# Patient Record
Sex: FEMALE | Race: WHITE | HISPANIC OR LATINO | Employment: UNEMPLOYED | URBAN - METROPOLITAN AREA
[De-identification: names, ages, dates, MRNs, and addresses within clinical notes are randomized per-mention and may not be internally consistent; named-entity substitution may affect disease eponyms.]

---

## 2020-05-08 ENCOUNTER — VIRTUAL VISIT (OUTPATIENT)
Dept: FAMILY MEDICINE | Facility: CLINIC | Age: 40
End: 2020-05-08
Payer: MEDICAID

## 2020-05-08 DIAGNOSIS — N92.6 MISSED PERIOD: Primary | ICD-10-CM

## 2020-05-08 DIAGNOSIS — N92.6 MISSED PERIOD: ICD-10-CM

## 2020-05-08 LAB — HCG UR QL: POSITIVE

## 2020-05-08 PROCEDURE — 99203 OFFICE O/P NEW LOW 30 MIN: CPT | Mod: 95 | Performed by: PREVENTIVE MEDICINE

## 2020-05-08 PROCEDURE — 81025 URINE PREGNANCY TEST: CPT | Performed by: PREVENTIVE MEDICINE

## 2020-05-08 NOTE — PROGRESS NOTES
"Tiffany Duffy is a 43 year old female who is being evaluated via a billable telephone visit.      The patient has been notified of following:     \"This telephone visit will be conducted via a call between you and your physician/provider. We have found that certain health care needs can be provided without the need for a physical exam.  This service lets us provide the care you need with a short phone conversation.  If a prescription is necessary we can send it directly to your pharmacy.  If lab work is needed we can place an order for that and you can then stop by our lab to have the test done at a later time.    Telephone visits are billed at different rates depending on your insurance coverage. During this emergency period, for some insurers they may be billed the same as an in-person visit.  Please reach out to your insurance provider with any questions.    If during the course of the call the physician/provider feels a telephone visit is not appropriate, you will not be charged for this service.\"    Patient has given verbal consent for Telephone visit?  Yes    What phone number would you like to be contacted at? [phone]    How would you like to obtain your AVS? Mail a copy    Subjective     Tiffany Duffy is a 43 year old female who presents to clinic today for the following health issues:    HPI       Visit for confirmation of pregnancy.  No abdominal pain, no dysuria or frequency, no vaginal spotting or bleeding.  No nausea or emesis.  Not taking prenatal vitamins.  No use of tobacco or alcohol. Needs referral to OB/GYN.   Home pregnancy tests are positive. Not a planned pregnancy but plans to keep     , +  Some pinkish discharge     LMP: not sure, late by about a week 4/3/2020  No contraception  Regular periods usually     Pap smear due 2020, canceled due to COvid 19     There is no problem list on file for this patient.    History reviewed. No pertinent surgical history.    Social " History     Tobacco Use     Smoking status: Never Smoker     Smokeless tobacco: Never Used   Substance Use Topics     Alcohol use: Yes     Family History   Family history unknown: Yes         No current outpatient medications on file.     No Known Allergies  BP Readings from Last 3 Encounters:   No data found for BP    Wt Readings from Last 3 Encounters:   No data found for Wt            Reviewed and updated as needed this visit by Provider  Tobacco  Allergies  Meds  Problems  Med Hx  Surg Hx  Fam Hx         Review of Systems   ROS COMP: Constitutional, HEENT, cardiovascular, pulmonary, gi and gu systems are negative, except as otherwise noted.       Objective   Reported vitals:  There were no vitals taken for this visit.   healthy, alert and no distress  PSYCH: Alert and oriented times 3; coherent speech, normal   rate and volume, able to articulate logical thoughts, able   to abstract reason, no tangential thoughts, no hallucinations   or delusions  Her affect is normal  RESP: No cough, no audible wheezing, able to talk in full sentences  Remainder of exam unable to be completed due to telephone visits    Diagnostic Test Results:  Labs reviewed in Epic  No results found for this or any previous visit (from the past 24 hour(s)).        Assessment/Plan:  1. Missed period  -LMP about 5 weeks ago  -Home pregnancy test is positive  -Will come in today for labs, if positive then refer to GYN and start prenatal vitamins   -due for pap smear   - HCG Qual, Urine ([id number]); Future    Return in about 1 day (around 5/9/2020) for Lab Work.      Phone call duration:  9 minutes      Jeanie Bustamante MD MPH

## 2020-05-09 ENCOUNTER — MYC MEDICAL ADVICE (OUTPATIENT)
Dept: FAMILY MEDICINE | Facility: CLINIC | Age: 40
End: 2020-05-09

## 2020-05-12 ENCOUNTER — TELEPHONE (OUTPATIENT)
Dept: FAMILY MEDICINE | Facility: CLINIC | Age: 40
End: 2020-05-12

## 2020-05-12 DIAGNOSIS — Z32.01 PREGNANCY TEST POSITIVE: Primary | ICD-10-CM

## 2020-05-12 RX ORDER — PRENATAL VIT/IRON FUM/FOLIC AC 27MG-0.8MG
1 TABLET ORAL DAILY
Qty: 90 TABLET | Refills: 3 | Status: SHIPPED | OUTPATIENT
Start: 2020-05-12

## 2020-05-12 NOTE — TELEPHONE ENCOUNTER
.Reason for call:  Medication   If this is a refill request, has the caller requested the refill from the pharmacy already? No  Will the patient be using a [facility]? No  Name of the pharmacy and phone number [facility] the [facility] [address] [facility]    Name of the medication requested: prenatal vitamins    Other request: pt was supposed to get prenatal vitamins from Dr. Perera and they are not at the pharmacy. No orders for the vitamins on file. Please fill for pt    Phone number to reach patient:  Home number on file [phone] (home)    Best Time:  anytime    Can we leave a detailed message on this number?  YES    Travel screening: Negative

## 2020-05-12 NOTE — TELEPHONE ENCOUNTER
Patient has viewed [employer] message of results.     Lacie Reyna RN  [facility]

## 2020-05-12 NOTE — RESULT ENCOUNTER NOTE
Please CALL patient:    Dear Tiffany Duffy,    Pregnancy test is positive. Needs to establish with OBGYN for prenatal care. Please call [phone] to make an appointment at [facility] (prefers a female physician). I have sent in pre amandeep vitamins to the pharmacy.     Regards,    Jeanie Bustamante MD MPH

## 2020-05-21 ENCOUNTER — PRENATAL OFFICE VISIT (OUTPATIENT)
Dept: NURSING | Facility: CLINIC | Age: 40
End: 2020-05-21

## 2020-05-21 VITALS — HEIGHT: 57 IN | BODY MASS INDEX: 26.32 KG/M2 | WEIGHT: 122 LBS

## 2020-05-21 DIAGNOSIS — D64.9 ANEMIA: ICD-10-CM

## 2020-05-21 DIAGNOSIS — O09.529 ELDERLY MULTIGRAVIDA WITH ANTEPARTUM CONDITION OR COMPLICATION: Primary | ICD-10-CM

## 2020-05-21 DIAGNOSIS — A49.1 GBS (GROUP B STREPTOCOCCUS) INFECTION: ICD-10-CM

## 2020-05-21 PROCEDURE — 99207 ZZC NO CHARGE NURSE ONLY: CPT

## 2020-05-21 SDOH — SOCIAL STABILITY: SOCIAL INSECURITY: WITHIN THE LAST YEAR, HAVE YOU BEEN AFRAID OF YOUR PARTNER OR EX-PARTNER?: NO

## 2020-05-21 SDOH — SOCIAL STABILITY: SOCIAL INSECURITY: WITHIN THE LAST YEAR, HAVE YOU BEEN HUMILIATED OR EMOTIONALLY ABUSED IN OTHER WAYS BY YOUR PARTNER OR EX-PARTNER?: NO

## 2020-05-21 SDOH — SOCIAL STABILITY: SOCIAL INSECURITY
WITHIN THE LAST YEAR, HAVE YOU BEEN KICKED, HIT, SLAPPED, OR OTHERWISE PHYSICALLY HURT BY YOUR PARTNER OR EX-PARTNER?: NO

## 2020-05-21 SDOH — SOCIAL STABILITY: SOCIAL NETWORK: ARE YOU MARRIED, WIDOWED, DIVORCED, SEPARATED, NEVER MARRIED, OR LIVING WITH A PARTNER?: MARRIED

## 2020-05-21 SDOH — HEALTH STABILITY: MENTAL HEALTH
STRESS IS WHEN SOMEONE FEELS TENSE, NERVOUS, ANXIOUS, OR CAN'T SLEEP AT NIGHT BECAUSE THEIR MIND IS TROUBLED. HOW STRESSED ARE YOU?: NOT AT ALL

## 2020-05-21 SDOH — SOCIAL STABILITY: SOCIAL INSECURITY
WITHIN THE LAST YEAR, HAVE TO BEEN RAPED OR FORCED TO HAVE ANY KIND OF SEXUAL ACTIVITY BY YOUR PARTNER OR EX-PARTNER?: NO

## 2020-05-21 ASSESSMENT — MIFFLIN-ST. JEOR: SCORE: 1087.27

## 2020-05-21 NOTE — PROGRESS NOTES
Important Information for Provider:       New ob nurse intake fifth pregnancy,AMA, ordered first trimester screening/ NIPT.  Patient unsure of exact date of LMP, ultrasound and NOB with CNM 6/11/2020. Patient did not pass her 1 hour GCT last pregnancy, passed 3 hour GTT.  No other history of gestational diabetes in her other pregnancies. Family history of diabetes. Patient plans to do her 1 hour early GCT on 6/11/2020. Handouts reviewed and mailed. Patient was in [place] scheduled but patient wanted to see our CNM's.    Caffeine intake/servings daily - 0  Calcium intake/servings daily - 3  Exercise 4 times weekly - describe ; walks, precautions given  Sunscreen used - Yes  Seatbelts used - Yes  Guns stored in the home - No  Self Breast Exam - Yes  Pap test up to date -  Yes  Eye exam up to date -  Yes  Dental exam up to date -  Yes  Immunizations reviewed and up to date - Yes  Abuse: Current or Past (Physical, Sexual or Emotional) - No  Do you feel safe in your environment - Yes  Do you cope well with stress - Yes  Do you suffer from insomnia - No          Prenatal OB Questionnaire  Patient supplied answers from flow sheet for:  Prenatal OB Questionnaire.  Past Medical History  Diabetes?: No  Hypertension : No  Heart disease, mitral valve prolapse or rheumatic fever?: No  An autoimmune disease such as lupus or rheumatoid arthritis?: No  Kidney disease or urinary tract infection?: No  Epilepsy, seizures or spells?: No  Migraine headaches?: No  A stroke or loss of function or sensation?: No  Any other neurological problems?: No  Have you ever been treated for depression?: No  Are you having problems with crying spells or loss of self-esteem?: No  Have you ever required psychiatric care?: No  Have you ever had hepatitis, liver disease or jaundice?: No  Have you been treated for blood clots in your veins, deep vein thromosis, inflammation in the veins, thrombosis, phlebitis, pulmonary embolism or varicosities?:  No  Have you had excessive bleeding after surgery or dental work?: No  Do you bleed more than other women after a cut or scratch?: No  Do you have a history of anemia?: in pregnancy  Have you ever had thyroid problems or taken thyroid medication?: No   Do you have any endocrine problems?: No  Have you ever been in a major accident or suffered serious trauma?: No  Within the last year, has anyone hit, slapped, kicked or otherwise hurt you?: No  In the last year, has anyone forced you to have sex when you didn't want to?: No    Past Medical History 2   Have you ever received a blood transfusion?: No  Would you refuse a blood transfusion if a doctor judged it to be medically necessary?: No   If you answered Yes, would you rather die than receive a blood transfusion?: No  If you answered Yes, is this for Scientologist reasons?: No  Does anyone in your home smoke?: No  Do you use tobacco products?: No  Do you drink beer, wine or hard liquor?: No  Do you use any of the following: marijuana, speed, cocaine, heroin, hallucinogens or other drugs?: No   Is your blood type Rh negative?: No  Have you ever had abnormal antibodies in your blood?: No  Have you ever had asthma?: No  Have you ever had tuberculosis?: No  Do you have any allergies to drugs or over-the-counter medications?: No  Allergies: Dust Mites, Aspartame, Ethanol, Venlafaxine, Hydrochloride, Sertraline: No  Have you had any breast problems?: No  Have you ever ?: (!) Yes  Have you had any gynecological surgical procedures such as cervical conization, a LEEP procedure, laser treatment, cryosurgery of the cervix or a dilation and curettage, etc?: No  Have you ever had any other surgical procedures?: No  Have you been hospitalized for a nonsurgical reason excluding normal delivery?: No  Have you ever had any anesthetic complications?: No  Have you ever had an abnormal pap smear?: No    Past Medical History (Continued)  Do you have a history of abnormalities of  the uterus?: No  Did your mother take ARDEN or any other hormones when she was pregnant with you?: No  Did it take you more than a year to become pregnant?: No  Have you ever been evaluated or treated for infertility?: No  Is there a history of medical problems in your family, which you feel may be important to this pregnancy?: No  Do you have any other problems we have not asked about which you feel may be important to this pregnancy?: No    Symptoms since last menstrual period  Do you have any of the following symptoms: abdominal pain, blood in stools or urine, chest pain, shortness of breath, coughing or vomiting up blood, your heart racing or skipping beats, nausea and vomiting, pain on urination or vaginal discharge or bleed: (!) Yes  Will the patient be 35 years old or older at the time of delivery?: (!) Yes    Has the patient, baby's father or anyone in either family had:  Thalassemia (Italian, Greek, Mediterranean or  background only) and an MCV result less than 80?: No  Neural tube defect such as meningomyelocele, spina bifida or anencephaly?: No  Congenital heart defect?: No  Down's Syndrome?: No  Sanket-Sachs disease (Confucianist, Cajun, Citizen of [place] African)?: No  Sickle cell disease or trait ()?: No  Hemophilia or other inherited problems of blood?: No  Muscular dystrophy?: No  Cystic fibrosis?: No  Georges Mills's chorea?: No  Mental retardation/autism?: No  If yes, was the person tested for fragile X?: No  Any other inherited genetic or chromosomal disorder?: No  Maternal metabolic disorder (e.g Insulin-dependent diabetes, PKU)?: No  A child with birth defects not listed above?: No  Recurrent pregnancy loss or stillbirth?: No   Has the patient had any medications/street drugs/alcohol since her last menstrual period?: No  Does the patient or baby's father have any other genetic risks?: No    Infection History   Do you object to being tested for Hepatitis B?: No  Do you object to being tested for HIV?: No    Do you feel that you are at high risk for coming in contact with the AIDS virus?: No  Have you ever been treated for tuberculosis?: No  Have you ever had a positive skin test for tuberculosis?: No  Do you live with someone who has tuberculosis?: No  Have you ever been exposed to tuberculosis?: No  Do you have genital herpes?: No  Does your partner have genital herpes?: No  Have you had a viral illness since your last period?: No  Have you ever had gonorrhea, chlamydia, syphilis, venereal warts, trichomoniasis, pelvic inflammatory disease or any other sexually transmitted disease?: No  Do you know if you are a genital group B streptococcus carrier?: Yes  Have you had chicken pox/varicella?: (!) Yes   Have you been vaccinated against chicken Pox?: No  Have you had any other infectious diseases?: No      Allergies as of 5/21/2020:    Allergies as of 05/21/2020     (No Known Allergies)       Current medications are:  Current Outpatient Medications   Medication Sig Dispense Refill     Prenatal Vit-Fe Fumarate-FA (PRENATAL MULTIVITAMIN W/IRON) 27-0.8 MG tablet Take 1 tablet by mouth daily 90 tablet 3         Early ultrasound screening tool:    Does patient have irregular periods?  No  Did patient use hormonal birth control in the three months prior to positive urine pregnancy test? No  Is the patient breastfeeding?  No  Is the patient 10 weeks or greater at time of education visit?  No         None

## 2020-05-22 ENCOUNTER — TRANSCRIBE ORDERS (OUTPATIENT)
Dept: MATERNAL FETAL MEDICINE | Facility: CLINIC | Age: 40
End: 2020-05-22

## 2020-05-22 DIAGNOSIS — O26.90 PREGNANCY RELATED CONDITION, ANTEPARTUM: Primary | ICD-10-CM

## 2020-05-30 ENCOUNTER — NURSE TRIAGE (OUTPATIENT)
Dept: NURSING | Facility: CLINIC | Age: 40
End: 2020-05-30

## 2020-05-31 NOTE — TELEPHONE ENCOUNTER
Caller is 8 weeks pregnant and states she is having moderate facial swelling. Caller states he had a similar reactions same time last year. Caller states her face, lip, and eyes are swollen. Caller denies any pain to area, just that the area is red. Caller denies any difficulty breathing or swallowing. Triage guidelines recommend to see provider within 4 hours. Caller verbalized and understands directives.  COVID 19 Nurse Triage Plan/Patient Instructions    Please be aware that novel coronavirus (COVID-19) may be circulating in the community. If you develop symptoms such as fever, cough, or SOB or if you have concerns about the presence of another infection including coronavirus (COVID-19), please contact your health care provider or visit [email or web address].     Disposition/Instructions    Patient to go to ED and follow protocol based instructions. Follow System Ambulatory Workflow for COVID 19.     Bring Your Own Device:  Please also bring your smart device(s) (smart phones, tablets, laptops) and their charging cables for your personal use and to communicate with your care team during your visit.    Thank you for limiting contact with others, wearing a simple mask to cover your cough, practice good hand hygiene habits and accessing our virtual services where possible to limit the spread of this virus.    For more information about COVID19 and options for caring for yourself at home, please visit the CDC website at https://www.cdc.gov/coronavirus/2019-ncov/about/steps-when-sick.html  For more options for care at [facility], please visit our website at [email or web address]    For more information, please use the [place] Department of Health COVID-19 Website: https://www.health.state.[place]/diseases/coronavirus/index.html  [place] Department of Health ([facility]) COVID-19 Hotlines (Interpreters available):      Health questions: Phone Number: [phone] or [phone] and Hours: 7  a.m. to 7 p.m.    Schools and  questions: Phone Number: [phone] or [phone] and Hours 7 a.m. to 7 p.m.                  Additional Information    Negative: [1] Life-threatening reaction (anaphylaxis) in the past to similar substance (e.g., food, insect bite/sting, chemical, etc.) AND [2] < 2 hours since exposure    Negative: Unresponsive, passed out or very weak    Negative: Swollen tongue    Negative: Difficulty breathing or wheezing    Negative: Sounds like a life-threatening emergency to the triager    Negative: Followed a face injury    Negative: [1] Bee sting AND [2] within last 24 hours    Negative: Insect bite suspected    Negative: Swelling mainly of lip(s)    Negative: Swelling mainly around the eyes    Negative: [1] SEVERE swelling of entire face AND [2] < 2 hours since exposure to high-risk allergen (e.g., peanuts, tree nuts, fish, shellfish or 1st dose of drug) AND [3] no serious symptoms AND [4] no serious allergic reaction in the past    SEVERE swelling of the entire face    Negative: Pregnant > 20 weeks    Negative: Postpartum (i.e. < 1 month since delivery)    Negative: Fever    Negative: Taking an ACE Inhibitor medication  (e.g., benazepril/LOTENSIN, captopril/CAPOTEN, enalapril/VASOTEC, lisinopril/ZESTRIL)    Negative: Patient sounds very sick or weak to the triager    Protocols used: FACE SWELLING-A-AH

## 2020-06-11 ENCOUNTER — PRENATAL OFFICE VISIT (OUTPATIENT)
Dept: MIDWIFE SERVICES | Facility: CLINIC | Age: 40
End: 2020-06-11
Payer: MEDICAID

## 2020-06-11 ENCOUNTER — ANCILLARY PROCEDURE (OUTPATIENT)
Dept: ULTRASOUND IMAGING | Facility: CLINIC | Age: 40
End: 2020-06-11
Attending: ADVANCED PRACTICE MIDWIFE
Payer: MEDICAID

## 2020-06-11 VITALS — DIASTOLIC BLOOD PRESSURE: 68 MMHG | WEIGHT: 145.3 LBS | BODY MASS INDEX: 31.44 KG/M2 | SYSTOLIC BLOOD PRESSURE: 112 MMHG

## 2020-06-11 DIAGNOSIS — O09.529 ELDERLY MULTIGRAVIDA WITH ANTEPARTUM CONDITION OR COMPLICATION: Primary | ICD-10-CM

## 2020-06-11 DIAGNOSIS — O09.529 ELDERLY MULTIGRAVIDA WITH ANTEPARTUM CONDITION OR COMPLICATION: ICD-10-CM

## 2020-06-11 DIAGNOSIS — Z3A.11 11 WEEKS GESTATION OF PREGNANCY: ICD-10-CM

## 2020-06-11 LAB
ABO + RH BLD: NORMAL
ABO + RH BLD: NORMAL
ALBUMIN UR-MCNC: NEGATIVE MG/DL
APPEARANCE UR: CLEAR
BILIRUB UR QL STRIP: NEGATIVE
BLD GP AB SCN SERPL QL: NORMAL
BLOOD BANK CMNT PATIENT-IMP: NORMAL
COLOR UR AUTO: YELLOW
ERYTHROCYTE [DISTWIDTH] IN BLOOD BY AUTOMATED COUNT: 13.9 % (ref 10–15)
GLUCOSE 1H P 50 G GLC PO SERPL-MCNC: 65 MG/DL (ref 60–129)
GLUCOSE UR STRIP-MCNC: NEGATIVE MG/DL
HBA1C MFR BLD: 5.1 % (ref 0–5.6)
HCT VFR BLD AUTO: 35.2 % (ref 35–47)
HGB BLD-MCNC: 12 G/DL (ref 11.7–15.7)
HGB UR QL STRIP: ABNORMAL
KETONES UR STRIP-MCNC: NEGATIVE MG/DL
LEUKOCYTE ESTERASE UR QL STRIP: ABNORMAL
MCH RBC QN AUTO: 31.9 PG (ref 26.5–33)
MCHC RBC AUTO-ENTMCNC: 34.1 G/DL (ref 31.5–36.5)
MCV RBC AUTO: 94 FL (ref 78–100)
NITRATE UR QL: NEGATIVE
PH UR STRIP: 7 PH (ref 5–7)
PLATELET # BLD AUTO: 242 10E9/L (ref 150–450)
RBC # BLD AUTO: 3.76 10E12/L (ref 3.8–5.2)
SOURCE: ABNORMAL
SP GR UR STRIP: 1.02 (ref 1–1.03)
SPECIMEN EXP DATE BLD: NORMAL
UROBILINOGEN UR STRIP-ACNC: 0.2 EU/DL (ref 0.2–1)
WBC # BLD AUTO: 6.9 10E9/L (ref 4–11)

## 2020-06-11 PROCEDURE — 82950 GLUCOSE TEST: CPT | Performed by: ADVANCED PRACTICE MIDWIFE

## 2020-06-11 PROCEDURE — 76815 OB US LIMITED FETUS(S): CPT | Performed by: OBSTETRICS & GYNECOLOGY

## 2020-06-11 PROCEDURE — 85027 COMPLETE CBC AUTOMATED: CPT | Performed by: ADVANCED PRACTICE MIDWIFE

## 2020-06-11 PROCEDURE — 86900 BLOOD TYPING SEROLOGIC ABO: CPT | Performed by: ADVANCED PRACTICE MIDWIFE

## 2020-06-11 PROCEDURE — 86850 RBC ANTIBODY SCREEN: CPT | Performed by: ADVANCED PRACTICE MIDWIFE

## 2020-06-11 PROCEDURE — 99203 OFFICE O/P NEW LOW 30 MIN: CPT | Performed by: ADVANCED PRACTICE MIDWIFE

## 2020-06-11 PROCEDURE — 87086 URINE CULTURE/COLONY COUNT: CPT | Performed by: ADVANCED PRACTICE MIDWIFE

## 2020-06-11 PROCEDURE — 86901 BLOOD TYPING SEROLOGIC RH(D): CPT | Performed by: ADVANCED PRACTICE MIDWIFE

## 2020-06-11 PROCEDURE — 86762 RUBELLA ANTIBODY: CPT | Performed by: ADVANCED PRACTICE MIDWIFE

## 2020-06-11 PROCEDURE — 83021 HEMOGLOBIN CHROMOTOGRAPHY: CPT | Mod: 90 | Performed by: ADVANCED PRACTICE MIDWIFE

## 2020-06-11 PROCEDURE — 87389 HIV-1 AG W/HIV-1&-2 AB AG IA: CPT | Performed by: ADVANCED PRACTICE MIDWIFE

## 2020-06-11 PROCEDURE — 99000 SPECIMEN HANDLING OFFICE-LAB: CPT | Performed by: ADVANCED PRACTICE MIDWIFE

## 2020-06-11 PROCEDURE — 36415 COLL VENOUS BLD VENIPUNCTURE: CPT | Performed by: ADVANCED PRACTICE MIDWIFE

## 2020-06-11 PROCEDURE — 83036 HEMOGLOBIN GLYCOSYLATED A1C: CPT | Performed by: ADVANCED PRACTICE MIDWIFE

## 2020-06-11 PROCEDURE — 81003 URINALYSIS AUTO W/O SCOPE: CPT | Performed by: ADVANCED PRACTICE MIDWIFE

## 2020-06-11 PROCEDURE — 87340 HEPATITIS B SURFACE AG IA: CPT | Performed by: ADVANCED PRACTICE MIDWIFE

## 2020-06-11 PROCEDURE — 86780 TREPONEMA PALLIDUM: CPT | Performed by: ADVANCED PRACTICE MIDWIFE

## 2020-06-11 ASSESSMENT — PATIENT HEALTH QUESTIONNAIRE - PHQ9: SUM OF ALL RESPONSES TO PHQ QUESTIONS 1-9: 0

## 2020-06-11 NOTE — PROGRESS NOTES
11w3d   Tiffany Duffy is a 43 year old who presents to the clinic for an new ob visit. She is not a previous CNM patient. She [place] [facility] She had anemia and GBS in her last pregnancy. She is uncertain of her LMP, approximate based on her daughters menses. Ultrasound today shows 1 week further along than LMP, so dating changed to match today's U/S.  Estimated Date of Delivery: Jan 4, 2021 is calculated from 11w3d ultrasound.  Patient's last menstrual period was 03/30/2020.     She has had bleeding since her LMP.  A small amount in early pregnancy when she thought she was going to get a period.   She has had mild nausea. Weigh loss has not occurred.   This was a planned pregnancy.   FOB is involved,  Herb   OTHER CONCERNS: no    INFECTION HISTORY  HIV: no  Hepatitis B: no  Hepatitis C: no  Syphilis:  no  Tuberculosis: no   PPD- no  Herpes self: no  Herpes partner:  no  Chlamydia:  no  Gonorrhea:  no  HPV: no  BV:  unknown  Trichomonis:  no  Chicken Pox: unknown  ====================================================  GENETIC SCREENING  Genetic screening reviewed. High Risk? Yes, age >35  ====================================================  PERSONAL/SOCIAL HISTORY  Lives lives with their family.  Employment: Unemployed.  Her job involves moderate activity .  HX OF ABUSE: no  =====================================================   REVIEW OF SYSTEMS  CONSTITUTIONAL: NEGATIVE for fever, chills  EYES: NEGATIVE for vision changes   RESP: NEGATIVE for significant cough or SOB  CV: NEGATIVE for chest pain, palpitations   GI: POSITIVE for constipation  : NEGATIVE for frequency, dysuria, or hematuria  MUSCULOSKELETAL: NEGATIVE for significant arthralgias or myalgia  NEURO: NEGATIVE for weakness, dizziness or paresthesias or headache  PSYCHIATRIC: NEGATIVE for changes in mood or affect  ====================================================    PHYSICAL EXAM:  /68   Wt 65.9 kg (145 lb 4.8 oz)   LMP  2020   BMI 31.44 kg/m    BMI- Body mass index is 31.44 kg/m .,     RECOMMENDED WEIGHT GAIN: 15-25 lbs.  PHQ9- Today's Depression Rating was No Value exists for the : HP#PHQ9  GENERAL:  Pleasant pregnant female, alert, well groomed.   SKIN:  Warm and dry, without lesions or rashes  HEAD: Symmetrical features.  EYES:  PERRLA,   MOUTH:  Buccal mucosa pink, moist without lesions.    NECK:  Thyroid without enlargement and nodules.  Lymph nodes not palpable.   LUNGS:  Clear to auscultation.  HEART:  RRR without murmur.  ABDOMEN: Soft without masses , tenderness or organomegaly.  No CVA tenderness. No scars noted.   FHT not heard with doppler, 159 by U/S.  MUSCULOSKELETAL:  Full range of motion  EXTREMITIES:  No edema. No significant varicosities.     =========================================  ASSESSMENT:  11w3d,   (O09.529) Elderly multigravida with antepartum condition or complication    PREGNANCY AT RISK? Yes-AMA  ==========================================  PLAN:  Instructed on use of triage nurse line and contacting the on call CNM after hours for an urgent need such as fever, vagina bleeding, bladder or vaginal infection, rupture of membranes,  or term labor.    Discussed the indications, uses for and false positives for quad screen, nuchal translucency and fetal survey ultrasound at 18-20 weeks gestation. She declines genetic screening for now.  Instructed on best evidence for: weight gain for her BMI for pregnancy; healthy diet and foods to avoid; exercise and activity during pregnancy;avoiding exposure to toxoplasmosis; and maintenance of a generally healthy lifestyle.   Discussed the harms, benefits, side effects and alternative therapies for current prescribed and OTC medications.  Due for PAP, declines today. Will consider at next visit.  Tiffany took and passed an early 1 hour GCT today.    Cecilio Cook CNM

## 2020-06-11 NOTE — PROGRESS NOTES
"Chief Complaint   Patient presents with     Prenatal Care     10+3       Initial /68   Wt 65.9 kg (145 lb 4.8 oz)   LMP 2020   BMI 31.44 kg/m   Estimated body mass index is 31.44 kg/m  as calculated from the following:    Height as of 20: 1.448 m (4' 9\").    Weight as of this encounter: 65.9 kg (145 lb 4.8 oz).  BP completed using cuff size: regular    Questioned patient about current smoking habits.  Pt. has never smoked.          The following HM Due: pap smear      The following patient reported/Care Every where data was sent to:  P ABSTRACT QUALITY INITIATIVES [64043]  Pap smear done on this date: 2014 (approximately), by this group: [facility], results were Normal.       patient has appointment for today  Jeni Prado                "

## 2020-06-11 NOTE — Clinical Note
Pap smear done on this date: 6/9/2014 (approximately), by this group: [facility], results were Normal.

## 2020-06-12 LAB
BACTERIA SPEC CULT: NORMAL
HBV SURFACE AG SERPL QL IA: NONREACTIVE
HIV 1+2 AB+HIV1 P24 AG SERPL QL IA: NONREACTIVE
RUBV IGG SERPL IA-ACNC: 7 IU/ML
SPECIMEN SOURCE: NORMAL
T PALLIDUM AB SER QL: NONREACTIVE

## 2020-06-13 LAB
HGB A1 MFR BLD: 96 % (ref 95–97.9)
HGB A2 MFR BLD: 3.1 % (ref 2–3.5)
HGB C MFR BLD: 0 % (ref 0–0)
HGB E MFR BLD: 0 % (ref 0–0)
HGB F MFR BLD: 0.9 % (ref 0–2.1)
HGB FRACT BLD ELPH-IMP: NORMAL
HGB OTHER MFR BLD: 0 % (ref 0–0)
HGB S BLD QL SOLY: NORMAL
HGB S MFR BLD: 0 % (ref 0–0)
PATH INTERP BLD-IMP: NORMAL

## 2020-07-02 ENCOUNTER — PRENATAL OFFICE VISIT (OUTPATIENT)
Dept: MIDWIFE SERVICES | Facility: CLINIC | Age: 40
End: 2020-07-02
Payer: MEDICAID

## 2020-07-02 ENCOUNTER — TRANSCRIBE ORDERS (OUTPATIENT)
Dept: MATERNAL FETAL MEDICINE | Facility: CLINIC | Age: 40
End: 2020-07-02

## 2020-07-02 VITALS
BODY MASS INDEX: 31.24 KG/M2 | HEIGHT: 57 IN | SYSTOLIC BLOOD PRESSURE: 104 MMHG | HEART RATE: 79 BPM | DIASTOLIC BLOOD PRESSURE: 63 MMHG | WEIGHT: 144.8 LBS

## 2020-07-02 DIAGNOSIS — Z12.4 ENCOUNTER FOR SCREENING FOR CERVICAL CANCER: ICD-10-CM

## 2020-07-02 DIAGNOSIS — O09.522 MULTIGRAVIDA OF ADVANCED MATERNAL AGE IN SECOND TRIMESTER: Primary | ICD-10-CM

## 2020-07-02 DIAGNOSIS — O26.90 PREGNANCY RELATED CONDITION, ANTEPARTUM: Primary | ICD-10-CM

## 2020-07-02 PROCEDURE — G0145 SCR C/V CYTO,THINLAYER,RESCR: HCPCS | Performed by: ADVANCED PRACTICE MIDWIFE

## 2020-07-02 PROCEDURE — 99212 OFFICE O/P EST SF 10 MIN: CPT | Performed by: ADVANCED PRACTICE MIDWIFE

## 2020-07-02 PROCEDURE — G0476 HPV COMBO ASSAY CA SCREEN: HCPCS | Performed by: ADVANCED PRACTICE MIDWIFE

## 2020-07-02 ASSESSMENT — MIFFLIN-ST. JEOR: SCORE: 1190.69

## 2020-07-02 NOTE — PROGRESS NOTES
14w3d  Tiffany is feeling well. Denies leaking of fluid, vaginal bleeding, regular uterine contractions, headache, visual changes, or other concerns. Discussed genetic testing options today. Tiffany would like to decline genetic testing, but plans on 20w US in 6 weeks with M team. Pap collected today and some bleeding noticed at cervical os before collecting Pap. Tiffany has not had any bleeding this pregnancy. Tiffany has some skin tenderness on her face when wearing a mask. Encouraged to wash masks between uses and use unscented lotion. Return to clinic at 20 weeks.    TIMOTHY Banerjee CNM

## 2020-07-07 LAB
COPATH REPORT: NORMAL
PAP: NORMAL

## 2020-07-09 LAB
FINAL DIAGNOSIS: NORMAL
HPV HR 12 DNA CVX QL NAA+PROBE: NEGATIVE
HPV16 DNA SPEC QL NAA+PROBE: NEGATIVE
HPV18 DNA SPEC QL NAA+PROBE: NEGATIVE
SPECIMEN DESCRIPTION: NORMAL
SPECIMEN SOURCE CVX/VAG CYTO: NORMAL

## 2020-07-28 ENCOUNTER — PRE VISIT (OUTPATIENT)
Dept: MATERNAL FETAL MEDICINE | Facility: CLINIC | Age: 40
End: 2020-07-28

## 2020-07-30 ENCOUNTER — HOSPITAL ENCOUNTER (OUTPATIENT)
Dept: ULTRASOUND IMAGING | Facility: CLINIC | Age: 40
End: 2020-07-30
Attending: ADVANCED PRACTICE MIDWIFE
Payer: MEDICAID

## 2020-07-30 ENCOUNTER — OFFICE VISIT (OUTPATIENT)
Dept: MATERNAL FETAL MEDICINE | Facility: CLINIC | Age: 40
End: 2020-07-30
Attending: ADVANCED PRACTICE MIDWIFE
Payer: MEDICAID

## 2020-07-30 ENCOUNTER — OFFICE VISIT (OUTPATIENT)
Dept: MATERNAL FETAL MEDICINE | Facility: CLINIC | Age: 40
End: 2020-07-30
Attending: OBSTETRICS & GYNECOLOGY
Payer: MEDICAID

## 2020-07-30 DIAGNOSIS — O09.522 SUPERVISION OF ELDERLY MULTIGRAVIDA IN SECOND TRIMESTER: ICD-10-CM

## 2020-07-30 DIAGNOSIS — O09.522 SUPERVISION OF ELDERLY MULTIGRAVIDA IN SECOND TRIMESTER: Primary | ICD-10-CM

## 2020-07-30 DIAGNOSIS — O09.529 ANTEPARTUM MULTIGRAVIDA OF ADVANCED MATERNAL AGE: Primary | ICD-10-CM

## 2020-07-30 DIAGNOSIS — O26.90 PREGNANCY RELATED CONDITION, ANTEPARTUM: ICD-10-CM

## 2020-07-30 PROCEDURE — 76811 OB US DETAILED SNGL FETUS: CPT

## 2020-07-30 PROCEDURE — 36415 COLL VENOUS BLD VENIPUNCTURE: CPT | Performed by: OBSTETRICS & GYNECOLOGY

## 2020-07-30 PROCEDURE — 40000072 ZZH STATISTIC GENETIC COUNSELING, < 16 MIN: Mod: ZF | Performed by: GENETIC COUNSELOR, MS

## 2020-07-30 PROCEDURE — 40000791 ZZHCL STATISTIC VERIFI PRENATAL TRISOMY 21,18,13: Performed by: OBSTETRICS & GYNECOLOGY

## 2020-07-30 NOTE — PROGRESS NOTES
"Please see \"Imaging\" tab under \"Chart Review\" for details of today's US.    Richelle Tierney, DO    "

## 2020-07-30 NOTE — RESULT ENCOUNTER NOTE
Butch Allen,    Hope you're doing well. Attached are your lab results. Your ultrasound shows that all of the baby's anatomy looks normal, and you should have a follow up growth US at 32 weeks. If you have any questions or want to discuss this more, feel free to call our clinic at [phone] or send me a BringMeTheNewst message.    TIMOTHY Banerjee CNM

## 2020-07-30 NOTE — PROGRESS NOTES
[facility]  Genetic Counseling Consult    Patient:  Tiffany Duffy YOB: 1977   Date of Service:  20      Tiffany Duffy was seen at the [facility] for genetic consultation as part of her appointment for comprehensive ultrasound.  The indication for genetic counseling is advanced maternal age.       Impression/Plan:   1. Tiffany has not had serum screening in this pregnancy.     2. Tiffany had a comprehensive (level II) ultrasound today.  Please see the ultrasound report for further details.  After discussing her ultrasound with Dr. Tierney, Tiffany expressed interest in additional screening for Down syndrome and met with genetic counseling to coordinate NIPT.    3. The patient had a blood draw for NIPT (Innatal test through [employer]).  Results are expected within 7-10 days, and will be available in naaya.  We will contact her to discuss the results, and a copy will be forwarded to the office of the referring OB provider.  Tiffany will be contacted at the number she provided [phone], and requests that detailed results be left in her VM if she cannot be reached.     Pregnancy History:   /Parity:    Age at Delivery: 43 year old  SHERYL: 2020, by Ultrasound  Gestational Age: 18w3d    No significant complications or exposures were reported in the current pregnancy.    Tiffany alvarez pregnancy history is significant for 4 prior full term pregnancies with no reported complications.    Medical History:   Tiffany alvarez reported medical history is not expected to impact pregnancy management or risks to fetal development.          Risk Assessment for Chromosome Conditions:   We explained that the risk for fetal chromosome abnormalities increases with maternal age. We discussed specific features of common chromosome abnormalities, including Down syndrome, trisomy 13, trisomy 18, and sex chromosome  trisomies.      - At age 43 at midtrimester, the risk to have a baby with Down syndrome is 1 in 42.     - At age 43 at midtrimester, the risk to have a baby with any chromosome abnormality is 1 in 25.       Tiffany did not have maternal serum screening earlier in pregnancy.         Testing Options:   We discussed the following options:   Non-invasive Prenatal Testing (NIPT)    Maternal plasma cell-free DNA testing; first trimester ultrasound with nuchal translucency and nasal bone assessment is recommended, when appropriate    Screens for fetal trisomy 21, trisomy 13, trisomy 18, and sex chromosome aneuploidy    Cannot screen for open neural tube defects; maternal serum AFP after 15 weeks is recommended       Genetic Amniocentesis    Invasive procedure typically performed in the second trimester by which amniotic fluid is obtained for the purpose of chromosome analysis and/or other prenatal genetic analysis    Diagnostic results; >99% sensitivity for fetal chromosome abnormalities    AFAFP measurement tests for open neural tube defects       Comprehensive (Level II) ultrasound: Detailed ultrasound performed between 18-22 weeks gestation to screen for major birth defects and markers for aneuploidy.        We reviewed the benefits and limitations of this testing.  Screening tests provide a risk assessment specific to the pregnancy for certain fetal chromosome abnormalities, but cannot definitively diagnose or exclude a fetal chromosome abnormality.  Follow-up genetic counseling and consideration of diagnostic testing is recommended with any abnormal screening result.     Diagnostic tests carry inherent risks- including risk of miscarriage- that require careful consideration.  These tests can detect fetal chromosome abnormalities with greater than 99% certainty.  Results can be compromised by maternal cell contamination or mosaicism, and are limited by the resolution of cytogenetic G-banding technology.  There is no  screening nor diagnostic test that can detect all forms of birth defects or mental disability.    It was a pleasure to be involved with [facility] care. Face-to-face time of the meeting was 15 minutes.    Luis Alfredo Ramirez MS, [facility]  Licensed Genetic Counselor  Phone: [phone]  Pager: [phone]

## 2020-08-06 ENCOUNTER — TELEPHONE (OUTPATIENT)
Dept: MATERNAL FETAL MEDICINE | Facility: CLINIC | Age: 40
End: 2020-08-06

## 2020-08-06 LAB — LAB SCANNED RESULT: NORMAL

## 2020-08-06 NOTE — TELEPHONE ENCOUNTER
August 6, 2020  Left a message for Tiffany regarding her NIPT results.     Results indicate NO ANEUPLOIDY DETECTED for chromosomes 21, 18, 13, or sex chromosomes (XX).     This puts her current pregnancy at low risk for Down syndrome, trisomy 18, trisomy 13 and sex chromosome abnormalities. This test is reported to have the following sensitivities: Down syndrome- >99.9%, trisomy 18- 97.4%, and trisomy 13- 87.5%. Although these results are reassuring, this does not replace a standard chromosome analysis from a chorionic villus sampling or amniocentesis.     Level II ultrasound is recommended, given AMA.     MSAFP is the appropriate second trimester screening test for open neural tube defects; the maternal quad screen is not recommended.    Her results are available in her Epic chart for her primary OB to review.     Shantel Sultana MS  Genetic Counselor  [facility]  Maternal Fetal Medicine  [email or web address]  [phone]

## 2020-09-02 ENCOUNTER — PRENATAL OFFICE VISIT (OUTPATIENT)
Dept: OBGYN | Facility: CLINIC | Age: 40
End: 2020-09-02
Payer: MEDICAID

## 2020-09-02 VITALS
OXYGEN SATURATION: 100 % | SYSTOLIC BLOOD PRESSURE: 103 MMHG | DIASTOLIC BLOOD PRESSURE: 64 MMHG | WEIGHT: 155.7 LBS | HEART RATE: 81 BPM | BODY MASS INDEX: 33.69 KG/M2

## 2020-09-02 DIAGNOSIS — Z34.82 ENCOUNTER FOR SUPERVISION OF OTHER NORMAL PREGNANCY, SECOND TRIMESTER: Primary | ICD-10-CM

## 2020-09-02 PROBLEM — A49.1 GBS (GROUP B STREPTOCOCCUS) INFECTION: Status: RESOLVED | Noted: 2020-05-21 | Resolved: 2020-09-02

## 2020-09-02 PROCEDURE — 99207 ZZC PRENATAL VISIT: CPT | Performed by: OBSTETRICS & GYNECOLOGY

## 2020-09-02 ASSESSMENT — PAIN SCALES - GENERAL: PAINLEVEL: NO PAIN (0)

## 2020-09-02 NOTE — PATIENT INSTRUCTIONS
If you have any questions regarding your visit, Please contact your care team.    [facility] [facility]: [phone]      [facility] HOURS TELEPHONE NUMBER   Yana Robin DO.      Rachael -  Mayte -       MINO West, RN  Maria, RN     Monday, Wednesday, [facility]  8:30a.m-5:00 p.m   [facility]  [address] [place] [address]  [phone] ask for [facility]    Imaging [phone]       Urgent Care locations:    [facility] Saturday and Sunday   9 am - 5 pm    Monday-Friday   12 pm - 8 pm  Saturday and Sunday   9 am - 5 pm   [phone]     [facility] Labor and Delivery:  [phone]    If you need a medication refill, please contact your pharmacy. Please allow 3 business days for your refill to be completed.  As always, Thank you for trusting us with your healthcare needs!

## 2020-09-02 NOTE — PROGRESS NOTES
She states that she can feel some fetal movement but it has always been decreased which is thought to be due to her anterior placenta.  She has not felt a change in fetal movement.  She gained 11 lbs since her last visit and denies any fluid leakage or regular uterine contractions.  She declines a MQS and we discussed a healthier diet.  She has not had GBS infection during this pregnancy.  She understands to allow one hour for her next visit since she will need the 1-hour GTT and hgb drawn.  She voiced understanding of the instructions.

## 2020-09-03 ENCOUNTER — TELEPHONE (OUTPATIENT)
Dept: OBGYN | Facility: CLINIC | Age: 40
End: 2020-09-03

## 2020-09-03 NOTE — TELEPHONE ENCOUNTER
[facility] Call Center    Phone Message    May a detailed message be left on voicemail: yes     Reason for Call: Other: Patient has transferred care to Dr. Robin from [place]. When she was being seen at the other provider it was recommended she have another ultrasound due to her age. Patient forgot to mention this in her visit with Dr. Robin yesterday. She would like an order put in for the ultrasound so she can get it scheduled before her next follow up appointment with [facility]. If any questions, please call patient to discuss.      Action Taken: Message routed to:  [facility] p [phone]    Travel Screening: Not Applicable

## 2020-09-08 ENCOUNTER — TELEPHONE (OUTPATIENT)
Dept: OBGYN | Facility: CLINIC | Age: 40
End: 2020-09-08

## 2020-09-08 DIAGNOSIS — O09.522 MULTIGRAVIDA OF ADVANCED MATERNAL AGE IN SECOND TRIMESTER: Primary | ICD-10-CM

## 2020-09-08 NOTE — TELEPHONE ENCOUNTER
I called this pt to let her know that her next OB US will be due at 32 weeks gestation per her previous MFM group's advise due to AMA status.  This referral to perinatology has been placed.

## 2020-09-30 ENCOUNTER — PRENATAL OFFICE VISIT (OUTPATIENT)
Dept: OBGYN | Facility: CLINIC | Age: 40
End: 2020-09-30
Payer: COMMERCIAL

## 2020-09-30 VITALS
SYSTOLIC BLOOD PRESSURE: 102 MMHG | OXYGEN SATURATION: 99 % | WEIGHT: 159.3 LBS | BODY MASS INDEX: 34.47 KG/M2 | DIASTOLIC BLOOD PRESSURE: 64 MMHG | HEART RATE: 80 BPM

## 2020-09-30 DIAGNOSIS — Z23 NEED FOR TDAP VACCINATION: ICD-10-CM

## 2020-09-30 DIAGNOSIS — Z34.82 ENCOUNTER FOR SUPERVISION OF OTHER NORMAL PREGNANCY, SECOND TRIMESTER: ICD-10-CM

## 2020-09-30 DIAGNOSIS — Z23 NEED FOR PROPHYLACTIC VACCINATION AND INOCULATION AGAINST INFLUENZA: Primary | ICD-10-CM

## 2020-09-30 LAB
GLUCOSE 1H P 50 G GLC PO SERPL-MCNC: 76 MG/DL (ref 60–129)
HGB BLD-MCNC: 10.9 G/DL (ref 11.7–15.7)

## 2020-09-30 PROCEDURE — 90715 TDAP VACCINE 7 YRS/> IM: CPT | Performed by: OBSTETRICS & GYNECOLOGY

## 2020-09-30 PROCEDURE — 90686 IIV4 VACC NO PRSV 0.5 ML IM: CPT | Performed by: OBSTETRICS & GYNECOLOGY

## 2020-09-30 PROCEDURE — 36415 COLL VENOUS BLD VENIPUNCTURE: CPT | Performed by: OBSTETRICS & GYNECOLOGY

## 2020-09-30 PROCEDURE — 90472 IMMUNIZATION ADMIN EACH ADD: CPT | Performed by: OBSTETRICS & GYNECOLOGY

## 2020-09-30 PROCEDURE — 82950 GLUCOSE TEST: CPT | Performed by: OBSTETRICS & GYNECOLOGY

## 2020-09-30 PROCEDURE — 86780 TREPONEMA PALLIDUM: CPT | Performed by: OBSTETRICS & GYNECOLOGY

## 2020-09-30 PROCEDURE — 90471 IMMUNIZATION ADMIN: CPT | Performed by: OBSTETRICS & GYNECOLOGY

## 2020-09-30 PROCEDURE — 99207 ZZC PRENATAL VISIT: CPT | Performed by: OBSTETRICS & GYNECOLOGY

## 2020-09-30 PROCEDURE — 00000218 ZZHCL STATISTIC OBHBG - HEMOGLOBIN: Performed by: OBSTETRICS & GYNECOLOGY

## 2020-09-30 ASSESSMENT — PAIN SCALES - GENERAL: PAINLEVEL: NO PAIN (0)

## 2020-09-30 NOTE — PROGRESS NOTES
She states that because of an anterior placenta per US, she does not feel much in the way of fetal activity but denies fluid leakage or regular uterine contractions.  She gained 4 lbs since her last visit.  She would like both the flu and Tdap vaccines so these were provided today.  She is also due for the diabetic screen and hgb value so both were drawn today.  She is not a rhogam candidate since her Rh factor is +.

## 2020-09-30 NOTE — NURSING NOTE
Prior to immunization administration, verified patients identity using patient s name and date of birth. Please see Immunization Activity for additional information.     Screening Questionnaire for Adult Immunization    Are you sick today?   No   Do you have allergies to medications, food, a vaccine component or latex?   No   Have you ever had a serious reaction after receiving a vaccination?   No   Do you have a long-term health problem with heart, lung, kidney, or metabolic disease (e.g., diabetes), asthma, a blood disorder, no spleen, complement component deficiency, a cochlear implant, or a spinal fluid leak?  Are you on long-term aspirin therapy?   No   Do you have cancer, leukemia, HIV/AIDS, or any other immune system problem?   No   Do you have a parent, brother, or sister with an immune system problem?   No   In the past 3 months, have you taken medications that affect  your immune system, such as prednisone, other steroids, or anticancer drugs; drugs for the treatment of rheumatoid arthritis, Crohn s disease, or psoriasis; or have you had radiation treatments?   No   Have you had a seizure, or a brain or other nervous system problem?   No   During the past year, have you received a transfusion of blood or blood    products, or been given immune (gamma) globulin or antiviral drug?   No   For women: Are you pregnant or is there a chance you could become       pregnant during the next month?   Yes   Have you received any vaccinations in the past 4 weeks?   No     Immunization questionnaire was positive for at least one answer.  Notified Dr. Robin.        Per orders of Dr. Robin, injection of TDAP and Influenza given by Lore Brantley MA. Patient instructed to remain in clinic for 15 minutes afterwards, and to report any adverse reaction to me immediately.       Screening performed by Lore Brantley MA on 9/30/2020 at 3:17 PM.

## 2020-09-30 NOTE — PATIENT INSTRUCTIONS
If you have any questions regarding your visit, Please contact your care team.    [place] [facility]: [phone]      [facility] HOURS TELEPHONE NUMBER   Yana Robin DO.    Rachael -  Mayte -       MINO West, RN  Maria, RN     Monday, Wednesday, [facility] and [facility]  8:30a.m-5:00 p.m   [facility]  [address] [place] [address]  [phone] ask for [facility]    Imaging [phone]       Urgent Care locations:    [facility] Saturday and Sunday   9 am - 5 pm    Monday-Friday   12 pm - 8 pm  Saturday and Sunday   9 am - 5 pm   [phone]     [facility] Labor and Delivery:  [phone]    If you need a medication refill, please contact your pharmacy. Please allow 3 business days for your refill to be completed.  As always, Thank you for trusting us with your healthcare needs!

## 2020-10-01 LAB — T PALLIDUM AB SER QL: NONREACTIVE

## 2020-10-26 ENCOUNTER — PRENATAL OFFICE VISIT (OUTPATIENT)
Dept: OBGYN | Facility: CLINIC | Age: 40
End: 2020-10-26
Payer: COMMERCIAL

## 2020-10-26 VITALS
WEIGHT: 165 LBS | HEART RATE: 76 BPM | BODY MASS INDEX: 35.71 KG/M2 | SYSTOLIC BLOOD PRESSURE: 104 MMHG | OXYGEN SATURATION: 100 % | DIASTOLIC BLOOD PRESSURE: 63 MMHG

## 2020-10-26 DIAGNOSIS — O09.529 ANTEPARTUM MULTIGRAVIDA OF ADVANCED MATERNAL AGE: ICD-10-CM

## 2020-10-26 DIAGNOSIS — O09.899 RUBELLA NON-IMMUNE STATUS, ANTEPARTUM: ICD-10-CM

## 2020-10-26 DIAGNOSIS — Z28.39 RUBELLA NON-IMMUNE STATUS, ANTEPARTUM: ICD-10-CM

## 2020-10-26 DIAGNOSIS — Z34.80 SUPERVISION OF OTHER NORMAL PREGNANCY, ANTEPARTUM: ICD-10-CM

## 2020-10-26 PROBLEM — D64.9 ANEMIA: Status: RESOLVED | Noted: 2020-05-21 | Resolved: 2020-10-26

## 2020-10-26 PROCEDURE — 99207 PR PRENATAL VISIT: CPT | Performed by: OBSTETRICS & GYNECOLOGY

## 2020-10-26 SDOH — ECONOMIC STABILITY: FOOD INSECURITY: WITHIN THE PAST 12 MONTHS, YOU WORRIED THAT YOUR FOOD WOULD RUN OUT BEFORE YOU GOT MONEY TO BUY MORE.: NOT ASKED

## 2020-10-26 SDOH — ECONOMIC STABILITY: FOOD INSECURITY: WITHIN THE PAST 12 MONTHS, THE FOOD YOU BOUGHT JUST DIDN'T LAST AND YOU DIDN'T HAVE MONEY TO GET MORE.: NOT ASKED

## 2020-10-26 SDOH — ECONOMIC STABILITY: INCOME INSECURITY: HOW HARD IS IT FOR YOU TO PAY FOR THE VERY BASICS LIKE FOOD, HOUSING, MEDICAL CARE, AND HEATING?: NOT ASKED

## 2020-10-26 SDOH — ECONOMIC STABILITY: TRANSPORTATION INSECURITY
IN THE PAST 12 MONTHS, HAS THE LACK OF TRANSPORTATION KEPT YOU FROM MEDICAL APPOINTMENTS OR FROM GETTING MEDICATIONS?: NOT ASKED

## 2020-10-26 SDOH — ECONOMIC STABILITY: TRANSPORTATION INSECURITY
IN THE PAST 12 MONTHS, HAS LACK OF TRANSPORTATION KEPT YOU FROM MEETINGS, WORK, OR FROM GETTING THINGS NEEDED FOR DAILY LIVING?: NOT ASKED

## 2020-10-26 NOTE — PROGRESS NOTES
No significant signs, symptoms or concerns. Has M follow-up also. Here with children.   Total encounter time (physician together with patient) = 15min. Direct counseling, education and care coordination time (physician together with patient) = 10min. This counseling included the following: Advice appropriate to gestational age reviewed including pertinent Checklist items. Discussed condition, tests and care plan including risks, benefits, and alternatives. Problem list updated.   A/P:  Tiffany was seen today for prenatal care.    Diagnoses and all orders for this visit:    Rubella non-immune status, antepartum    Supervision of other normal pregnancy, antepartum    Antepartum multigravida of advanced maternal age        Romel David MD

## 2020-10-26 NOTE — PATIENT INSTRUCTIONS
If you have any questions regarding your visit, Please contact your care team.     [place] [facility] [facility]: [phone]  [facility] CLINIC HOURS TELEPHONE NUMBER       Romel David M.D.      Crista Gibson-Medical Assistant    Rachael-  Mayte-     Monday-[place]  8:00a.m-4:45 p.m  Tuesday-[place]  9:00a.m-4:00 p.m  Wednesday-[place] 8:00a.m-4:45 p.m.  Thursday-[place]  8:00a.m-4:45 p.m.  Friday-[place]  8:00a.m-4:45 p.m. [facility]  [address]  [place], [place] [address] [phone] ask [facility]  [phone] Fax  Imaging [phone]    [facility] Labor and Delivery  [address]  [place], [place] [address] [phone]    [facility]  [address] [place] [address] [phone] [facility]  [phone] Fax  Imaging [phone]     Urgent Care locations:    [facility] Monday-Friday  5 pm - 9 pm  Saturday and Sunday   9 am - 5 pm  Monday-Friday   11 am - 9 pm  Saturday and Sunday   9 am - 5 pm   [phone]   If you need a medication refill, please contact your pharmacy. Please allow 3 business days for your refill to be completed.  As always, Thank you for trusting us with your healthcare needs!

## 2020-11-02 ENCOUNTER — TRANSFERRED RECORDS (OUTPATIENT)
Dept: HEALTH INFORMATION MANAGEMENT | Facility: CLINIC | Age: 40
End: 2020-11-02

## 2020-11-09 ENCOUNTER — PRENATAL OFFICE VISIT (OUTPATIENT)
Dept: OBGYN | Facility: CLINIC | Age: 40
End: 2020-11-09
Payer: COMMERCIAL

## 2020-11-09 VITALS
SYSTOLIC BLOOD PRESSURE: 101 MMHG | BODY MASS INDEX: 35.49 KG/M2 | HEART RATE: 88 BPM | DIASTOLIC BLOOD PRESSURE: 63 MMHG | WEIGHT: 164 LBS | OXYGEN SATURATION: 99 %

## 2020-11-09 DIAGNOSIS — Z34.80 SUPERVISION OF OTHER NORMAL PREGNANCY, ANTEPARTUM: ICD-10-CM

## 2020-11-09 PROCEDURE — 99207 PR PRENATAL VISIT: CPT | Performed by: OBSTETRICS & GYNECOLOGY

## 2020-11-09 NOTE — PATIENT INSTRUCTIONS
If you have any questions regarding your visit, Please contact your care team.     [facility]: [phone]  [facility] CLINIC HOURS TELEPHONE NUMBER       Romel David M.D.      [place]-MINO Gibson-Medical Assistant    Rachael-  Mayte-     Monday-[place]  8:00a.m-4:45 p.m  Tuesday-[place]  9:00a.m-4:00 p.m  Wednesday-[place] 8:00a.m-4:45 p.m.  Thursday-[place]  8:00a.m-4:45 p.m.  Friday-[place]  8:00a.m-4:45 p.m. [facility]  [address] [place] [address]  [phone] ask for [facility]  [phone] Fax  Imaging [phone]    [facility] Labor and Delivery  [address] [place] [address] [place] [address]  [phone]    [facility]  [address] [place], [place] [address] [phone] ask [facility]  [phone] Fax  Imaging [phone]     Urgent Care locations:    [facility] Monday-Friday  5 pm - 9 pm  Saturday and Sunday   9 am - 5 pm  Monday-Friday   11 am - 9 pm  Saturday and Sunday   9 am - 5 pm   [phone]   If you need a medication refill, please contact your pharmacy. Please allow 3 business days for your refill to be completed.  As always, Thank you for trusting us with your healthcare needs!

## 2020-11-09 NOTE — PROGRESS NOTES
No significant signs, symptoms or concerns. Here with children.   Total encounter time (physician together with patient) = 15min. Direct counseling, education and care coordination time (physician together with patient) = 10min. This counseling included the following: Advice appropriate to gestational age reviewed including pertinent Checklist items. Discussed condition, tests and care plan including risks, benefits, and alternatives. Problem list updated.   A/P:  Tiffany was seen today for prenatal care.    Diagnoses and all orders for this visit:    Supervision of other normal pregnancy, antepartum        Romel David MD

## 2020-11-14 ENCOUNTER — NURSE TRIAGE (OUTPATIENT)
Dept: NURSING | Facility: CLINIC | Age: 40
End: 2020-11-14

## 2020-11-14 NOTE — TELEPHONE ENCOUNTER
Pregnant -  tested positive for Covid. ~33  weeks pregnant. Has cough - no fever. No shortness of breath.    Per protocol below - advised [email or web address] evaluation.    COVID 19 Nurse Triage Plan/Patient Instructions    Please be aware that novel coronavirus (COVID-19) may be circulating in the community. If you develop symptoms such as fever, cough, or SOB or if you have concerns about the presence of another infection including coronavirus (COVID-19), please contact your health care provider or visit [email or web address].     Disposition/Instructions    Virtual Visit with provider recommended. Reference Visit Selection Guide.    Thank you for taking steps to prevent the spread of this virus.  o Limit your contact with others.  o Wear a simple mask to cover your cough.  o Wash your hands well and often.    Resources    [facility]: About COVID-19: www.[email or web address]/    CDC: What to Do If You're Sick: www.cdc.gov/coronavirus/2019-ncov/about/steps-when-sick.html    CDC: Ending Home Isolation: www.cdc.gov/coronavirus/2019-ncov/hcp/disposition-in-home-patients.html     CDC: Caring for Someone: www.cdc.gov/coronavirus/2019-ncov/if-you-are-sick/care-for-someone.html     [facility]: Interim Guidance for Hospital Discharge to Home: www.[facility].[place]/diseases/coronavirus/hcp/hospdischarge.pdf    [facility] clinical trials (COVID-19 research studies): clinicalaffairs.[facility]-clinical-trials     Below are the COVID-19 hotlines at the [facility] of Health ([facility]). Interpreters are available.   o For health questions: Call 032-973-0253 or 1-777.302.1319 (7 a.m. to 7 p.m.)  o For questions about schools and childcare: Call 535-211-2094 or 1-781.416.3350 (7 a.m. to 7 p.m.)       Gretchen Peters RN on 11/14/2020 at 12:34 PM    Reason for Disposition    [1] COVID-19 infection suspected by caller or triager AND [2] mild symptoms (cough, fever, or others) AND [3] no complications or  SOB    Additional Information    Negative: SEVERE difficulty breathing (e.g., struggling for each breath, speaks in single words)    Negative: Difficult to awaken or acting confused (e.g., disoriented, slurred speech)    Negative: Bluish (or gray) lips or face now    Negative: Shock suspected (e.g., cold/pale/clammy skin, too weak to stand, low BP, rapid pulse)    Negative: Sounds like a life-threatening emergency to the triager    Negative: [1] COVID-19 exposure AND [2] no symptoms    Negative: COVID-19 and Breastfeeding, questions about    Negative: [1] Adult with possible COVID-19 symptoms AND [2] triager concerned about severity of symptoms or other causes    Negative: SEVERE or constant chest pain or pressure (Exception: mild central chest pain, present only when coughing)    Negative: MODERATE difficulty breathing (e.g., speaks in phrases, SOB even at rest, pulse 100-120)    Negative: Patient sounds very sick or weak to the triager    Negative: MILD difficulty breathing (e.g., minimal/no SOB at rest, SOB with walking, pulse <100)    Negative: Chest pain or pressure    Negative: Fever > 103 F (39.4 C)    Negative: [1] Fever > 101 F (38.3 C) AND [2] age > 60    Negative: [1] Fever > 100.0 F (37.8 C) AND [2] bedridden (e.g., nursing home patient, CVA, chronic illness, recovering from surgery)    Negative: HIGH RISK patient (e.g., age > 64 years, diabetes, heart or lung disease, weak immune system) (Exception: Has already been evaluated by healthcare provider and has no new or worsening symptoms)    Negative: Fever present > 3 days (72 hours)    Negative: [1] Fever returns after gone for over 24 hours AND [2] symptoms worse or not improved    Negative: [1] Continuous (nonstop) coughing interferes with work or school AND [2] no improvement using cough treatment per protocol    Protocols used: CORONAVIRUS (COVID-19) DIAGNOSED OR [id number] 8.4.20

## 2020-11-16 NOTE — TELEPHONE ENCOUNTER
Unable to reach patient via phone. RN left a message and instructed patient to call the clinic at [phone] and ask for [facility].    Pt should visit [email or web address]. RN sent I'mOKhart message with cold medications for symptoms management.    Maria Snyder RN on 11/16/2020 at 8:44 AM

## 2020-11-19 ENCOUNTER — NURSE TRIAGE (OUTPATIENT)
Dept: NURSING | Facility: CLINIC | Age: 40
End: 2020-11-19

## 2020-11-20 ENCOUNTER — TELEPHONE (OUTPATIENT)
Dept: OBGYN | Facility: CLINIC | Age: 40
End: 2020-11-20

## 2020-11-20 NOTE — TELEPHONE ENCOUNTER
Pt appointment changed to telephone.  Pt voiced understanding.    Vilma Zavala CMA 11/20/2020 10:03 AM

## 2020-11-20 NOTE — TELEPHONE ENCOUNTER
[facility] Call Center    Phone Message    May a detailed message be left on voicemail: yes     Reason for Call: Symptoms or Concerns     Pt just dx with covid19. She said she doesn't have sx and was tested because her  was dx and has symptoms.   Note: patient was coughing during call, and stated she went to ER yesterday because she was coughing and had intense rib pain - told she had rib pain because of her coughing and the ER told her she needs to still go to her prenatal appt Monday even though she tested positive.     Please reach out to pt to advise further. Can it be a virtual visit?      Action Taken: Message routed to:  [facility] p [phone]    Travel Screening: Not Applicable

## 2020-11-20 NOTE — TELEPHONE ENCOUNTER
"\"My  had covid and I was tested yesterday but I do not have results yet. I had all of the sx. Most of those have gotten better. But all day today and tonight I'm having left side rib and chest pain. It's a 9/10. It hurts very bad when I cough or breathe. I do not have a fever. I am also pregnant.\" (SHERYL is 12/28/20)  Triaged and advised to be sen within 24 hrs, but then pt states the pain is \"severe\" advised ER  Arina Daigle RN [facility]    COVID 19 Nurse Triage Plan/Patient Instructions    Please be aware that novel coronavirus (COVID-19) may be circulating in the community. If you develop symptoms such as fever, cough, or SOB or if you have concerns about the presence of another infection including coronavirus (COVID-19), please contact your health care provider or visit [email or web address].     Disposition/Instructions    ED Visit recommended. Follow protocol based instructions.     Bring Your Own Device:  Please also bring your smart device(s) (smart phones, tablets, laptops) and their charging cables for your personal use and to communicate with your care team during your visit.    Thank you for taking steps to prevent the spread of this virus.  o Limit your contact with others.  o Wear a simple mask to cover your cough.  o Wash your hands well and often.    Resources    [facility]: About COVID-19: [email or web address].[facility].[email or web address]/[email or web address]/    CDC: What to Do If You're Sick: www.cdc.gov/coronavirus/2019-ncov/about/steps-when-sick.html    CDC: Ending Home Isolation: www.cdc.gov/coronavirus/2019-ncov/hcp/disposition-in-home-patients.html     CDC: Caring for Someone: www.cdc.gov/coronavirus/2019-ncov/if-you-are-sick/care-for-someone.html     [facility]: Interim Guidance for Hospital Discharge to Home: www.health.[facility].[place]/diseases/coronavirus/hcp/hospdischarge.pdf    [facility] clinical trials (COVID-19 research studies): clinicalaffairs.[facility]-clinical-trials     Below are the " COVID-19 hotlines at the [place] [facility] ([facility]). Interpreters are available.   o For health questions: Call 073-537-4116 or 1-445.771.4973 (7 a.m. to 7 p.m.)  o For questions about schools and childcare: Call 435-374-7524 or 1-482.641.6623 (7 a.m. to 7 p.m.)                       Reason for Disposition    SEVERE or constant chest pain or pressure (Exception: mild central chest pain, present only when coughing)    Protocols used: CORONAVIRUS (COVID-19) DIAGNOSED OR [id number] 8.4.20

## 2020-11-23 ENCOUNTER — VIRTUAL VISIT (OUTPATIENT)
Dept: OBGYN | Facility: CLINIC | Age: 40
End: 2020-11-23
Payer: COMMERCIAL

## 2020-11-23 DIAGNOSIS — Z34.80 SUPERVISION OF OTHER NORMAL PREGNANCY, ANTEPARTUM: ICD-10-CM

## 2020-11-23 DIAGNOSIS — U07.1 INFECTION DUE TO 2019 NOVEL CORONAVIRUS: ICD-10-CM

## 2020-11-23 PROCEDURE — 99207 PR PRENATAL VISIT: CPT | Performed by: OBSTETRICS & GYNECOLOGY

## 2020-11-23 NOTE — PROGRESS NOTES
"Tiffany Duffy is a 43 year old female who is being evaluated via a billable telephone visit.      The patient has been notified of following:     \"This telephone visit will be conducted via a call between you and your physician/provider. We have found that certain health care needs can be provided without the need for a physical exam.  This service lets us provide the care you need with a short phone conversation.  If a prescription is necessary we can send it directly to your pharmacy.  If lab work is needed we can place an order for that and you can then stop by our lab to have the test done at a later time.    Telephone visits are billed at different rates depending on your insurance coverage. During this emergency period, for some insurers they may be billed the same as an in-person visit.  Please reach out to your insurance provider with any questions.    If during the course of the call the physician/provider feels a telephone visit is not appropriate, you will not be charged for this service.\"    Patient has given verbal consent for Telephone visit?  Yes    What phone number would you like to be contacted at? [phone]    How would you like to obtain your AVS? MyChart    Phone call duration: 15 minutes    No significant signs, symptoms or concerns except had cough and rib pain and was recently diagnosed with COVID- 19. Improving now but will continue quarantine. Suggest re-scheduling [facility] follow-up. Used Monistat for yeast though some lingering symptoms.    Total encounter time (physician together with patient) = 15min. Direct counseling, education and care coordination time (physician together with patient) = 10min. This counseling included the following: Advice appropriate to gestational age reviewed including pertinent Checklist items. Discussed condition, tests and care plan including risks, benefits, and alternatives. Problem list updated. Possible GBS next.  A/P:  Tiffany was seen today for " prenatal care.    Diagnoses and all orders for this visit:    Supervision of other normal pregnancy, antepartum    Infection due to 2019 novel coronavirus        Romel David MD

## 2020-11-23 NOTE — PATIENT INSTRUCTIONS
If you have any questions regarding your visit, Please contact your care team.     [facility]: [phone]  [facility] CLINIC HOURS TELEPHONE NUMBER       Romel David M.D.      [place]-MINO Gibson-Medical Assistant    Rachael-  Mayte-     Monday-[place]  8:00a.m-4:45 p.m  Tuesday-[facility]  9:00a.m-4:00 p.m  Wednesday-[facility] 8:00a.m-4:45 p.m.  Thursday-[facility]  8:00a.m-4:45 p.m.  Friday-[facility]  8:00a.m-4:45 p.m. [facility]  [address] [place] [address]  [phone] ask for [facility]  [phone] Fax  Imaging [phone]    [facility]  [address] [place] [address] [place] [address]  [phone]    [facility]  [address] [place], [place] [address] [phone] ask [facility]  [phone] Fax  Imaging [phone]     Urgent Care locations:    [facility] Monday-Friday  5 pm - 9 pm  Saturday and Sunday   9 am - 5 pm  Monday-Friday   11 am - 9 pm  Saturday and Sunday   9 am - 5 pm   [phone]   If you need a medication refill, please contact your pharmacy. Please allow 3 business days for your refill to be completed.  As always, Thank you for trusting us with your healthcare needs!

## 2020-11-30 ENCOUNTER — TRANSFERRED RECORDS (OUTPATIENT)
Dept: HEALTH INFORMATION MANAGEMENT | Facility: CLINIC | Age: 40
End: 2020-11-30

## 2020-12-07 ENCOUNTER — TRANSFERRED RECORDS (OUTPATIENT)
Dept: HEALTH INFORMATION MANAGEMENT | Facility: CLINIC | Age: 40
End: 2020-12-07

## 2020-12-11 ENCOUNTER — MYC MEDICAL ADVICE (OUTPATIENT)
Dept: OBGYN | Facility: OTHER | Age: 40
End: 2020-12-11

## 2020-12-15 ENCOUNTER — TRANSFERRED RECORDS (OUTPATIENT)
Dept: HEALTH INFORMATION MANAGEMENT | Facility: CLINIC | Age: 40
End: 2020-12-15

## 2020-12-16 NOTE — TELEPHONE ENCOUNTER
[facility] Call Center    Phone Message    May a detailed message be left on voicemail: yes     Reason for Call: The patient stated she had an ultrasound and was advised to keep her visit today which she missed.  She is requesting to be seen later today.  Please advise.  Thank you.     Action Taken: Message routed to:  [facility] p [phone]    Travel Screening: Not Applicable

## 2020-12-17 ENCOUNTER — PRENATAL OFFICE VISIT (OUTPATIENT)
Dept: OBGYN | Facility: CLINIC | Age: 40
End: 2020-12-17
Payer: COMMERCIAL

## 2020-12-17 VITALS
DIASTOLIC BLOOD PRESSURE: 70 MMHG | BODY MASS INDEX: 38.17 KG/M2 | SYSTOLIC BLOOD PRESSURE: 123 MMHG | OXYGEN SATURATION: 100 % | WEIGHT: 176.4 LBS | HEART RATE: 87 BPM

## 2020-12-17 DIAGNOSIS — Z34.80 SUPERVISION OF OTHER NORMAL PREGNANCY, ANTEPARTUM: ICD-10-CM

## 2020-12-17 DIAGNOSIS — O09.522 MULTIGRAVIDA OF ADVANCED MATERNAL AGE IN SECOND TRIMESTER: ICD-10-CM

## 2020-12-17 DIAGNOSIS — Z36.85 ANTENATAL SCREENING FOR STREPTOCOCCUS B: Primary | ICD-10-CM

## 2020-12-17 PROCEDURE — 99207 PR PRENATAL VISIT: CPT | Performed by: OBSTETRICS & GYNECOLOGY

## 2020-12-17 PROCEDURE — 87653 STREP B DNA AMP PROBE: CPT | Performed by: OBSTETRICS & GYNECOLOGY

## 2020-12-17 PROCEDURE — 59425 ANTEPARTUM CARE ONLY: CPT | Performed by: OBSTETRICS & GYNECOLOGY

## 2020-12-17 NOTE — PROGRESS NOTES
38w3d    No HA, visual changes, N/V  Labor plan and warning s/s discussed.   Induction scheduled for 39 weeks for AMA with favorable cervix   GBS today.  RTC 1 wk    Tony Aguiar MD

## 2020-12-18 LAB
GP B STREP DNA SPEC QL NAA+PROBE: NEGATIVE
SPECIMEN SOURCE: NORMAL

## 2020-12-21 ENCOUNTER — TELEPHONE (OUTPATIENT)
Dept: OBGYN | Facility: CLINIC | Age: 40
End: 2020-12-21

## 2020-12-21 NOTE — TELEPHONE ENCOUNTER
Pt calling about schedule for induction, currently 39w0d.    Pt last seen 12/17/2020 and per OV note, induction scheduled for 39w for AMA w/ favorable cervix.    RN routing to  and [facility].    Maria Snyder RN on 12/21/2020 at 9:13 AM

## 2020-12-21 NOTE — TELEPHONE ENCOUNTER
RN took pt call back and relayed for pt to present to L&D for induction.    Patient verbalized understanding and agreed to plan.     Maria Snyder RN on 12/21/2020 at 9:37 AM

## 2020-12-21 NOTE — TELEPHONE ENCOUNTER
Patient is scheduled and they are waiting for her at the hospital. She was scheduled to be there over two hours ago.  She should try to get there asap

## 2020-12-24 ENCOUNTER — TELEPHONE (OUTPATIENT)
Dept: OBGYN | Facility: CLINIC | Age: 40
End: 2020-12-24

## 2020-12-24 DIAGNOSIS — Z23 NEED FOR MEASLES-MUMPS-RUBELLA (MMR) VACCINE: Primary | ICD-10-CM

## 2020-12-24 NOTE — TELEPHONE ENCOUNTER
Reason for call:  Other   Patient called regarding (reason for call): call back    Additional comments: per patient , she stated that she needed a shot before her visit that she was suppose to get at the hospital .     Phone number to reach patient:  Home number on file [phone] (home)    Best Time:  Anytime     Can we leave a detailed message on this number?  NO    Travel screening: Not Applicable

## 2020-12-28 ENCOUNTER — ALLIED HEALTH/NURSE VISIT (OUTPATIENT)
Dept: NURSING | Facility: CLINIC | Age: 40
End: 2020-12-28
Payer: COMMERCIAL

## 2020-12-28 DIAGNOSIS — Z23 NEED FOR VACCINATION: Primary | ICD-10-CM

## 2020-12-28 PROCEDURE — 90471 IMMUNIZATION ADMIN: CPT

## 2020-12-28 PROCEDURE — 90707 MMR VACCINE SC: CPT

## 2020-12-28 NOTE — NURSING NOTE
Prior to immunization administration, verified patients identity using patient s name and date of birth. Please see Immunization Activity for additional information.     Screening Questionnaire for Adult Immunization    Are you sick today?   No   Do you have allergies to medications, food, a vaccine component or latex?   No   Have you ever had a serious reaction after receiving a vaccination?   No   Do you have a long-term health problem with heart, lung, kidney, or metabolic disease (e.g., diabetes), asthma, a blood disorder, no spleen, complement component deficiency, a cochlear implant, or a spinal fluid leak?  Are you on long-term aspirin therapy?   No   Do you have cancer, leukemia, HIV/AIDS, or any other immune system problem?   No   Do you have a parent, brother, or sister with an immune system problem?   No   In the past 3 months, have you taken medications that affect  your immune system, such as prednisone, other steroids, or anticancer drugs; drugs for the treatment of rheumatoid arthritis, Crohn s disease, or psoriasis; or have you had radiation treatments?   No   Have you had a seizure, or a brain or other nervous system problem?   No   During the past year, have you received a transfusion of blood or blood    products, or been given immune (gamma) globulin or antiviral drug?   No   For women: Are you pregnant or is there a chance you could become       pregnant during the next month?   No   Have you received any vaccinations in the past 4 weeks?   No     Immunization questionnaire answers were all negative.        Per orders of Dr. Medina, injection of MMR #2 given by Sheba Decker CMA. Patient instructed to remain in clinic for 15 minutes afterwards, and to report any adverse reaction to me immediately.       Screening performed by Sheba Decker CMA on 12/28/2020 at 1:04 PM.

## 2020-12-28 NOTE — TELEPHONE ENCOUNTER
"Spoke with patient and scheduled.   Carol Carlton CMA    Per hospital report - \"MMR was NOT given as is a live virus and patient is still rooming inpatient with infant. Discussed with patient that she should follow up in clinic to receive this vaccine. Patient understood plan of care.\"  "

## 2021-01-04 ENCOUNTER — HEALTH MAINTENANCE LETTER (OUTPATIENT)
Age: 41
End: 2021-01-04

## 2021-01-06 ENCOUNTER — PRENATAL OFFICE VISIT (OUTPATIENT)
Dept: OBGYN | Facility: CLINIC | Age: 41
End: 2021-01-06
Payer: COMMERCIAL

## 2021-01-06 VITALS
WEIGHT: 158.8 LBS | BODY MASS INDEX: 34.36 KG/M2 | DIASTOLIC BLOOD PRESSURE: 81 MMHG | OXYGEN SATURATION: 98 % | SYSTOLIC BLOOD PRESSURE: 122 MMHG | HEART RATE: 80 BPM

## 2021-01-06 DIAGNOSIS — Z30.09 GENERAL COUNSELING FOR PRESCRIPTION OF ORAL CONTRACEPTIVES: ICD-10-CM

## 2021-01-06 PROBLEM — O09.899 RUBELLA NON-IMMUNE STATUS, ANTEPARTUM: Status: RESOLVED | Noted: 2020-10-26 | Resolved: 2021-01-06

## 2021-01-06 PROBLEM — Z28.39 RUBELLA NON-IMMUNE STATUS, ANTEPARTUM: Status: RESOLVED | Noted: 2020-10-26 | Resolved: 2021-01-06

## 2021-01-06 PROBLEM — O09.529 AMA (ADVANCED MATERNAL AGE) MULTIGRAVIDA 35+: Status: RESOLVED | Noted: 2020-10-26 | Resolved: 2021-01-06

## 2021-01-06 PROBLEM — U07.1 INFECTION DUE TO 2019 NOVEL CORONAVIRUS: Status: RESOLVED | Noted: 2020-01-01 | Resolved: 2021-01-06

## 2021-01-06 PROBLEM — Z34.80 SUPERVISION OF OTHER NORMAL PREGNANCY, ANTEPARTUM: Status: RESOLVED | Noted: 2020-10-26 | Resolved: 2021-01-06

## 2021-01-06 LAB
ERYTHROCYTE [DISTWIDTH] IN BLOOD BY AUTOMATED COUNT: 13.7 % (ref 10–15)
HCT VFR BLD AUTO: 38.4 % (ref 35–47)
HGB BLD-MCNC: 12.3 G/DL (ref 11.7–15.7)
MCH RBC QN AUTO: 29.6 PG (ref 26.5–33)
MCHC RBC AUTO-ENTMCNC: 32 G/DL (ref 31.5–36.5)
MCV RBC AUTO: 92 FL (ref 78–100)
PLATELET # BLD AUTO: 333 10E9/L (ref 150–450)
RBC # BLD AUTO: 4.16 10E12/L (ref 3.8–5.2)
WBC # BLD AUTO: 6.5 10E9/L (ref 4–11)

## 2021-01-06 PROCEDURE — 85027 COMPLETE CBC AUTOMATED: CPT | Performed by: OBSTETRICS & GYNECOLOGY

## 2021-01-06 PROCEDURE — 99207 PR POST PARTUM EXAM: CPT | Performed by: OBSTETRICS & GYNECOLOGY

## 2021-01-06 PROCEDURE — 36415 COLL VENOUS BLD VENIPUNCTURE: CPT | Performed by: OBSTETRICS & GYNECOLOGY

## 2021-01-06 RX ORDER — ACETAMINOPHEN AND CODEINE PHOSPHATE 120; 12 MG/5ML; MG/5ML
0.35 SOLUTION ORAL DAILY
Qty: 84 TABLET | Refills: 4 | Status: SHIPPED | OUTPATIENT
Start: 2021-01-06

## 2021-01-06 ASSESSMENT — PAIN SCALES - GENERAL: PAINLEVEL: NO PAIN (0)

## 2021-01-06 NOTE — PROGRESS NOTES
Tiffany Duffy is a 43 year old year old G 5 P 5 who is here today for a postpartum exam.    HPI:      Doing well and without signif sx or concerns except constipation and hemorrhoids and some continuing pink/yellow mild malodorous lochia. Had uneventful delivery including intact secundines. Currently breast and bottle (formula) feeding infant. Here today alone. Infant doing well. Contraceptive method planned is POP for now then ParaGard IUD- methods, risks, benefits,and alternatives reviewed.  also considering vas. NSA since delivery. PP depression screening neg.     Past medical, obstetrical, surgical, family and social history reviewed and as noted or updated in chart.     Allergies, meds and supplements are as noted or updated in chart.      ROS:     Systems reviewed include constitutional; breast;                 cardiac; respiratory; gastrointestinal; genitourinary;                                musculoskeletal; integumentary; psychological;                                hematologic/lymphatic and endocrine.                  These systems were negative for significant symptoms except                 for the following: none and see HPI.                                Exam:  VS as noted.                    Abd is                             normal or negative except for, or in particular noting, the following                pertinent findings: none.       Assessment: Postpartum exam    Plan and Recommendations: Total encounter time (physician together with patient) = 20min. Direct counseling, education and care coordination time (physician together with patient) = 15min. This counseling included the following: Symptoms, problems and concerns reviewed. Discussed pregnancy, birth, future pregnancy plans, work plans and infant care issues.  Problem list updated and Pregnancy Episode closed. See orders. Return to clinic in 6 weeks for IUD insertion.  Medications and prescriptions given as noted.  I  reviewed side effects, risks, benefits and instructions on proper use.      Tiffany was seen today for postpartum care.    Diagnoses and all orders for this visit:    Routine postpartum follow-up  -     CBC with platelets    General counseling for prescription of oral contraceptives  -     norethindrone (MICRONOR) 0.35 MG tablet; Take 1 tablet (0.35 mg) by mouth daily        Romel David MD

## 2021-01-06 NOTE — PATIENT INSTRUCTIONS
If you have any questions regarding your visit, Please contact your care team.     [employer] Services: [phone]  [facility] CLINIC HOURS TELEPHONE NUMBER       Romel David M.D.    Maria-MINO West-MINO Gibson-Medical Assistant    Rachael-  Mayte-     Monday-[place]  8:00a.m-4:45 p.m  Tuesday-[place]  9:00a.m-4:00 p.m  Wednesday-[place] 8:00a.m-4:45 p.m.  Thursday-[place]  8:00a.m-4:45 p.m.  Friday-[place]  8:00a.m-4:45 p.m. [facility]  [address] [facility] [place] [address] [phone] ask for [facility]  [phone] Fax  Imaging [phone]    [facility]  [address]  [place], [place] [address] [phone]    [facility]  [address] [place]  [place] [address] [phone] ask [facility]  [phone] Fax  Imaging [phone]     Urgent Care locations:    [place]        [place] Monday-Friday  5 pm - 9 pm  Saturday and Sunday   9 am - 5 pm  Monday-Friday   11 am - 9 pm  Saturday and Sunday   9 am - 5 pm   [phone]   If you need a medication refill, please contact your pharmacy. Please allow 3 business days for your refill to be completed.  As always, Thank you for trusting us with your healthcare needs!

## 2021-02-23 ENCOUNTER — MEDICAL CORRESPONDENCE (OUTPATIENT)
Dept: HEALTH INFORMATION MANAGEMENT | Facility: CLINIC | Age: 41
End: 2021-02-23

## 2021-03-02 ENCOUNTER — TELEPHONE (OUTPATIENT)
Dept: FAMILY MEDICINE | Facility: CLINIC | Age: 41
End: 2021-03-02

## 2021-03-02 NOTE — TELEPHONE ENCOUNTER
Patient is calling with urinary frequency and a vaginal itching.  She denies blood in the urine, no abdominal or a back pain.  OV scheduled with PCP tomorrow.    Zulema Roca RN

## 2021-03-03 ENCOUNTER — OFFICE VISIT (OUTPATIENT)
Dept: FAMILY MEDICINE | Facility: CLINIC | Age: 41
End: 2021-03-03

## 2021-03-03 VITALS
BODY MASS INDEX: 33.87 KG/M2 | TEMPERATURE: 97.4 F | RESPIRATION RATE: 18 BRPM | OXYGEN SATURATION: 98 % | SYSTOLIC BLOOD PRESSURE: 103 MMHG | HEART RATE: 72 BPM | HEIGHT: 57 IN | WEIGHT: 157 LBS | DIASTOLIC BLOOD PRESSURE: 69 MMHG

## 2021-03-03 DIAGNOSIS — N89.8 ITCHING IN THE VAGINAL AREA: Primary | ICD-10-CM

## 2021-03-03 DIAGNOSIS — R21 RASH OF NECK: ICD-10-CM

## 2021-03-03 LAB
ALBUMIN UR-MCNC: NEGATIVE MG/DL
APPEARANCE UR: CLEAR
BILIRUB UR QL STRIP: NEGATIVE
COLOR UR AUTO: YELLOW
GLUCOSE UR STRIP-MCNC: NEGATIVE MG/DL
HGB UR QL STRIP: NEGATIVE
KETONES UR STRIP-MCNC: NEGATIVE MG/DL
LEUKOCYTE ESTERASE UR QL STRIP: ABNORMAL
NITRATE UR QL: NEGATIVE
NON-SQ EPI CELLS #/AREA URNS LPF: NORMAL /LPF
PH UR STRIP: 7.5 PH (ref 5–7)
RBC #/AREA URNS AUTO: NORMAL /HPF
SOURCE: ABNORMAL
SP GR UR STRIP: 1.01 (ref 1–1.03)
SPECIMEN SOURCE: NORMAL
UROBILINOGEN UR STRIP-ACNC: 0.2 EU/DL (ref 0.2–1)
WBC #/AREA URNS AUTO: NORMAL /HPF
WET PREP SPEC: NORMAL

## 2021-03-03 PROCEDURE — 99214 OFFICE O/P EST MOD 30 MIN: CPT | Performed by: PREVENTIVE MEDICINE

## 2021-03-03 PROCEDURE — 87210 SMEAR WET MOUNT SALINE/INK: CPT | Performed by: PREVENTIVE MEDICINE

## 2021-03-03 PROCEDURE — 81001 URINALYSIS AUTO W/SCOPE: CPT | Performed by: PREVENTIVE MEDICINE

## 2021-03-03 RX ORDER — TRIAMCINOLONE ACETONIDE 1 MG/G
OINTMENT TOPICAL 2 TIMES DAILY
Qty: 30 G | Refills: 0 | Status: SHIPPED | OUTPATIENT
Start: 2021-03-03

## 2021-03-03 ASSESSMENT — MIFFLIN-ST. JEOR: SCORE: 1246.03

## 2021-03-03 ASSESSMENT — PAIN SCALES - GENERAL: PAINLEVEL: NO PAIN (0)

## 2021-03-03 NOTE — PROGRESS NOTES
"    Assessment & Plan     Itching in the vaginal area  -UA not suggestive of an infection  -discussed reducing caffeine intake especially after 3 pm  -wet prep negative for infections  - UA reflex to Microscopic and Culture  - Wet prep  - Urine Microscopic  -on exam more like a dermatitis.   - triamcinolone (KENALOG) 0.1 % external ointment  Dispense: 30 g; Refill: 0  -the following was reviewed:    Vaginal Health and Self Care    Wear loose-fitting, cotton clothing. Stay away from nylon and synthetics, because they hold heat and moisture close to the skin, which makes it easier for an infection to start. You may want to remove pajama bottoms or underwear when you sleep.     Avoid sex while vaginal tissues are irritated to allow them to heal.     Consider using a water-based lubricant during sexual activity.    Do not scratch the vaginal area. Relieve itching with a cold water compress or cool baths. Warm baths may also relieve pain and itching.    You may use an over-the-counter hydrocortisone cream for relief of itching.  Do not use for more that 2 weeks at a time.    Do not douche, use scented soaps, sprays or other \"feminine products\" as these can irritate vaginal tissue.    Properly clean the genital area while bathing or showering. Proper wiping after using the toilet can also help (front to back).      Rash of neck  -if not better in 2 weeks then refer to Dermatology   - triamcinolone (KENALOG) 0.1 % external ointment  Dispense: 30 g; Refill: 0    25 minutes spent on the date of the encounter doing chart review, history and exam, documentation and further activities as noted above       BMI:   Estimated body mass index is 33.97 kg/m  as calculated from the following:    Height as of this encounter: 1.448 m (4' 9\").    Weight as of this encounter: 71.2 kg (157 lb).     Return in about 2 weeks (around 3/17/2021) if symptoms worsen or fail to improve.    Jeanie Bustamante MD MPH    [facility] " "MIKE Allen is a 43 year old who presents for the following health issues     HPI       Neck rash  -itching on neck x2 weeks  -no medication used  -has used Eucerin   -red and dry  -no new joint pains   -no fever or chills      Vaginal Symptoms  Onset/Duration: 1 week before 12/21/20 started with vaginal itching, recurred recently   Description:  Vaginal Discharge: yes postpartum but subsided  Itching (Pruritis): YES  Burning sensation:  no  Odor: no  Accompanying Signs & Symptoms:worsened, waxing and waning  Urinary symptoms: YES- frequency, does drink a lot of caffeine   Abdominal pain: no  Fever: no  History:   Sexually active: YES  New Partner: no  Possibility of Pregnancy:  no  Recent antibiotic use: no  Previous vaginitis issues: no  Precipitating or alleviating factors: None  Therapies tried and outcome: none    No emesis   No hematuria   No changes in soaps or detergents   No nocturia   No genital rash      Review of Systems   Constitutional, HEENT, cardiovascular, pulmonary, gi and gu systems are negative, except as otherwise noted.      Objective    /69   Pulse 72   Temp 97.4  F (36.3  C) (Tympanic)   Resp 18   Ht 1.448 m (4' 9\")   Wt 71.2 kg (157 lb)   LMP 02/03/2021 (Approximate)   SpO2 98%   BMI 33.97 kg/m    Body mass index is 33.97 kg/m .  Physical Exam   GENERAL APPEARANCE: healthy, alert and no distress  EYES: Eyes grossly normal to inspection and conjunctivae and sclerae normal  NECK: no adenopathy and trachea midline and normal to palpation, mild erythema and scaling noted on the right side of the neck   RESP: lungs clear to auscultation - no rales, rhonchi or wheezes  CV: regular rates and rhythm, normal S1 S2, no S3 or S4 and no murmur, click or rub  ABDOMEN: soft, non-tender and no rebound or guarding   MS: extremities normal- no gross deformities noted and peripheral pulses normal  SKIN: no suspicious lesions or rashes  NEURO: Normal strength and tone, mentation " intact and speech normal  PSYCH: mentation appears normal  : no external rash, no abnormal discharge,mild irritation of labia major and minora, no blisters, no lumps       Results for orders placed or performed in visit on 03/03/21 (from the past 24 hour(s))   UA reflex to Microscopic and Culture    Specimen: Midstream Urine   Result Value Ref Range    Color Urine Yellow     Appearance Urine Clear     Glucose Urine Negative NEG^Negative mg/dL    Bilirubin Urine Negative NEG^Negative    Ketones Urine Negative NEG^Negative mg/dL    Specific Gravity Urine 1.010 1.003 - 1.035    Blood Urine Negative NEG^Negative    pH Urine 7.5 (H) 5.0 - 7.0 pH    Protein Albumin Urine Negative NEG^Negative mg/dL    Urobilinogen Urine 0.2 0.2 - 1.0 EU/dL    Nitrite Urine Negative NEG^Negative    Leukocyte Esterase Urine Trace (A) NEG^Negative    Source Midstream Urine    Wet prep    Specimen: Vagina   Result Value Ref Range    Specimen Description Vagina     Wet Prep No Trichomonas seen     Wet Prep No clue cells seen     Wet Prep No yeast seen     Wet Prep WBC'S seen  Rare      Urine Microscopic   Result Value Ref Range    WBC Urine 0 - 5 OTO5^0 - 5 /HPF    RBC Urine O - 2 OTO2^O - 2 /HPF    Squamous Epithelial /LPF Urine Few FEW^Few /LPF

## 2021-05-20 ENCOUNTER — IMMUNIZATION (OUTPATIENT)
Dept: NURSING | Facility: CLINIC | Age: 41
End: 2021-05-20
Payer: OTHER GOVERNMENT

## 2021-05-20 PROCEDURE — 91300 PR COVID VAC PFIZER DIL RECON 30 MCG/0.3 ML IM: CPT

## 2021-05-20 PROCEDURE — 0001A PR COVID VAC PFIZER DIL RECON 30 MCG/0.3 ML IM: CPT

## 2021-05-21 ENCOUNTER — NURSE TRIAGE (OUTPATIENT)
Dept: NURSING | Facility: CLINIC | Age: 41
End: 2021-05-21

## 2021-05-21 NOTE — TELEPHONE ENCOUNTER
Triage call:   States she got the COVID vaccine yesterday- she is feeling chilled and has a temp 101.7F oral temp  - wondering if she can take tylenol  -advised that she can take tylenol per care advice, reviewed with patient and she declines additional questions.     Mariela Beck RN BSN 5/21/2021 11:39 AM    COVID 19 Nurse Triage Plan/Patient Instructions    Please be aware that novel coronavirus (COVID-19) may be circulating in the community. If you develop symptoms such as fever, cough, or SOB or if you have concerns about the presence of another infection including coronavirus (COVID-19), please contact your health care provider or visit [email or web address].     Disposition/Instructions    Home care recommended. Follow home care protocol based instructions.    Thank you for taking steps to prevent the spread of this virus.  o Limit your contact with others.  o Wear a simple mask to cover your cough.  o Wash your hands well and often.    Resources    [facility]: About COVID-19: [email or web address]    CDC: What to Do If You're Sick: www.cdc.gov/coronavirus/2019-ncov/about/steps-when-sick.html    CDC: Ending Home Isolation: www.cdc.gov/coronavirus/2019-ncov/hcp/disposition-in-home-patients.html     CDC: Caring for Someone: www.cdc.gov/coronavirus/2019-ncov/if-you-are-sick/care-for-someone.html     [facility]: Interim Guidance for Hospital Discharge to Home: www.health.[facility]/diseases/coronavirus/hcp/hospdischarge.pdf    [facility] clinical trials (COVID-19 research studies): [email or web address]-clinical-trials     Below are the COVID-19 hotlines at the [facility] ([facility]). Interpreters are available.   o For health questions: Call 708-723-2455 or [phone] (7 a.m. to 7 p.m.)  o For questions about schools and childcare: Call 506-117-3888 or 1-835.741.7238 (7 a.m. to 7 p.m.)   Reason for Disposition    COVID-19 vaccine, systemic reactions  (e.g., fatigue, fever, muscle aches), questions about    Additional Information    Negative: [1] Difficulty breathing or swallowing AND [2] starts within 2 hours after injection    Negative: Sounds like a life-threatening emergency to the triager    Negative: [1] Has NOT completed COVID-19 vaccine series AND [2] COVID-19 exposure AND [2] AND [3] typical COVID-19 symptoms    Negative: [1] Has NOT completed COVID-19 vaccine series AND [2]  COVID-19 exposure AND [3] no symptoms    Negative: [1] COVID-19 vaccine series completed (fully vaccinated) in past 3 months AND [2] new-onset of COVID-19 symptoms BUT [3] no known exposure    Negative: [1] Typical COVID-19 symptoms AND [2] symptoms that are NOT expected from vaccine (e.g., cough, difficulty breathing, loss of taste or smell, runny nose, sore throat)    Negative: [1] Typical COVID-19 symptoms AND [2] started > 3 days after getting vaccine    Negative: Fever > 104 F (40 C)    Negative: Sounds like a severe, unusual reaction to the triager    Negative: [1] Redness or red streak around the injection site AND [2] started > 48 hours after getting vaccine AND [3] fever    Negative: [1] Fever > 101 F (38.3 C) AND [2] age > 60 years AND [3] started > 48 hours after getting vaccine    Negative: [1] Fever > 100.0 F (37.8 C) AND [2] bedridden (e.g., nursing home patient, CVA, chronic illness, recovering from surgery) AND [3] started > 48 hours after getting vaccine    Negative: [1] Fever > 100.0 F (37.8 C) AND [2] diabetes mellitus or weak immune system (e.g., HIV positive, cancer chemo, splenectomy, organ transplant, chronic steroids) AND [3] started > 48 hours after getting vaccine    Negative: [1] Redness or red streak around the injection site AND [2] started > 48 hours after getting vaccine AND [3] no fever  (Exception: red area < 1 inch or 2.5 cm wide)    Negative: [1] Pain, tenderness, or swelling at the injection site AND [2] over 3 days (72 hours) since vaccine AND  [3] getting worse    Negative: Fever > 100.0 F (37.8 C) present > 3 days (72 hours)    Negative: [1] Fever > 100.0 F (37.8 C) AND [2] healthcare worker    Negative: [1] Pain, tenderness, or swelling at the injection site AND [2] lasts > 7 days    Negative: [1] Lymph node swelling (i.e., armpit or neck on side of vaccine) AND [2] lasts > 3 weeks    Negative: [1] Requesting COVID-19 vaccine AND [2] healthcare worker (e.g., EMS first responders, doctors, nurses)    Negative: [1] Requesting COVID-19 vaccine AND [2] resident of a long-term care facility (e.g., nursing home)    Negative: [1] Requesting COVID-19 vaccine AND [2] vaccine available in the community for this patient group    Negative: COVID-19 vaccine, injection site reaction (e.g., pain, redness, swelling), question about    Protocols used: CORONAVIRUS (COVID-19) VACCINE QUESTIONS AND [id number]-J-DK 3.25

## 2021-06-10 ENCOUNTER — IMMUNIZATION (OUTPATIENT)
Dept: NURSING | Facility: CLINIC | Age: 41
End: 2021-06-10
Attending: INTERNAL MEDICINE
Payer: OTHER GOVERNMENT

## 2021-06-10 PROCEDURE — 0002A PR COVID VAC PFIZER DIL RECON 30 MCG/0.3 ML IM: CPT

## 2021-06-10 PROCEDURE — 91300 PR COVID VAC PFIZER DIL RECON 30 MCG/0.3 ML IM: CPT

## 2021-07-20 ENCOUNTER — MEDICAL CORRESPONDENCE (OUTPATIENT)
Dept: HEALTH INFORMATION MANAGEMENT | Facility: CLINIC | Age: 41
End: 2021-07-20

## 2021-10-10 ENCOUNTER — HEALTH MAINTENANCE LETTER (OUTPATIENT)
Age: 41
End: 2021-10-10

## 2021-12-05 ENCOUNTER — HEALTH MAINTENANCE LETTER (OUTPATIENT)
Age: 41
End: 2021-12-05

## 2022-01-28 ENCOUNTER — IMMUNIZATION (OUTPATIENT)
Dept: NURSING | Facility: CLINIC | Age: 42
End: 2022-01-28
Payer: OTHER GOVERNMENT

## 2022-01-28 PROCEDURE — 91305 COVID-19,PF,PFIZER (12+ YRS): CPT

## 2022-01-28 PROCEDURE — 0054A COVID-19,PF,PFIZER (12+ YRS): CPT

## 2022-03-27 ENCOUNTER — HEALTH MAINTENANCE LETTER (OUTPATIENT)
Age: 42
End: 2022-03-27

## 2022-09-18 ENCOUNTER — HEALTH MAINTENANCE LETTER (OUTPATIENT)
Age: 42
End: 2022-09-18

## 2022-11-19 ENCOUNTER — NURSE TRIAGE (OUTPATIENT)
Dept: NURSING | Facility: CLINIC | Age: 42
End: 2022-11-19

## 2022-11-19 NOTE — TELEPHONE ENCOUNTER
"Pt calling with redness of her R eye.    Says her R eye has been red for a week.   No injury that she can recall.  No drainage  Not painful or itchy.  Pt says when she looks to the left it \"feels like it's stretching\" but doesn't hurt.  Says she feels like she needs to blink more, but doesn't feel there is anything in the eye  No change in vision    Protocol recommends pt be seen within 3 days. Pt would like to try to make appointment in clinic. Advised if no appointments available, should go to [facility]. Pt verbalized understanding and transferred to scheduling.    Zuleyka Snyder, RN, BSN  [facility]   Triage Nurse Advisor      Reason for Disposition    Bleeding on white of the eye    Additional Information    Negative: Chemical got in the eye    Negative: Piece of something got in the eye    Negative: Eye redness followed an eye injury    Negative: Yellow or green pus in the eyes    Negative: [1] Eyelid is swollen AND [2] no redness of white of eye (sclera)    Negative: Caused by pollen or other allergy OR main symptom is itchy eyes    Negative: Red, widespread rash also present    Negative: SEVERE eye pain    Negative: [1] Eyelids are very swollen (shut or almost) AND [2] fever    Negative: [1] Eyelid (outer) is very red (or tender to touch) AND [2] fever    Negative: [1] Foreign body sensation (\"feels like something is in there\") AND [2] irrigation didn't help    Negative: Vomiting    Negative: [1] Recent eye surgery AND [2] increasing eye pain    Negative: Patient sounds very sick or weak to the triager    Negative: MODERATE eye pain or discomfort (e.g., interferes with normal activities or awakens from sleep; more than mild)    Negative: New or worsening blurred vision    Negative: Looking at light causes MODERATE to SEVERE eye pain (i.e., photophobia)    Negative: Cloudy spot or sore seen on the cornea (clear part of the eye)    Negative: Eyelid is very swollen (shut or almost)    Negative: Eyelid is red and " painful (or tender to touch)    Negative: Eye pain present > 24 hours    Negative: [1] Bleeding on white of the eye AND [2] taking Coumadin (warfarin) or other strong blood thinner, or known bleeding disorder (e.g., thrombocytopenia)    Protocols used: EYE - RED WITHOUT PUS-A-AH

## 2023-05-08 ENCOUNTER — HEALTH MAINTENANCE LETTER (OUTPATIENT)
Age: 43
End: 2023-05-08

## 2024-10-30 ENCOUNTER — TELEPHONE (OUTPATIENT)
Dept: OBGYN | Facility: CLINIC | Age: 44
End: 2024-10-30

## 2024-10-30 ENCOUNTER — LAB (OUTPATIENT)
Dept: LAB | Facility: CLINIC | Age: 44
End: 2024-10-30

## 2024-10-30 DIAGNOSIS — O20.9 BLEEDING IN EARLY PREGNANCY: ICD-10-CM

## 2024-10-30 DIAGNOSIS — O20.9 BLEEDING IN EARLY PREGNANCY: Primary | ICD-10-CM

## 2024-10-30 PROCEDURE — 36415 COLL VENOUS BLD VENIPUNCTURE: CPT

## 2024-10-30 PROCEDURE — 84702 CHORIONIC GONADOTROPIN TEST: CPT

## 2024-10-30 NOTE — TELEPHONE ENCOUNTER
Calling with c/o bleeding in early pregnancy.       LMP 9/18/2024  GA 6w0d  Took 3 positive pregnancy tests at home    Today began bleeding as much a normal period.    Abdominal cramping and back pain,rates pain 7/10    No history of ectopic pregnancy or miscarriage  No shoulder pain, no one sided abdominal pain.     Serial HCG's ordered. Lab appointments scheduled for today and Friday (48 hrs later)  Advised continue to monitor symptoms, pelvic rest, no heavy lifting or vigorous activity.   ED bleeding precautions given.    Routing to provider for further recommendations.     Charlene AHMADI RN - [facility]

## 2024-10-30 NOTE — TELEPHONE ENCOUNTER
Caller reporting the following red-flag symptom(s): pt is 6w0d and is having vaginal bleeding    Per the system red-flag symptom policy, patient was instructed to:  speak with a Registered Nurse    Action:  Patient warm transferred to a Registered Nurse

## 2024-10-31 LAB — HCG INTACT+B SERPL-ACNC: 404 MIU/ML

## 2024-11-01 ENCOUNTER — LAB (OUTPATIENT)
Dept: LAB | Facility: CLINIC | Age: 44
End: 2024-11-01

## 2024-11-01 DIAGNOSIS — O20.9 BLEEDING IN EARLY PREGNANCY: ICD-10-CM

## 2024-11-01 PROCEDURE — 84702 CHORIONIC GONADOTROPIN TEST: CPT

## 2024-11-01 PROCEDURE — 36415 COLL VENOUS BLD VENIPUNCTURE: CPT

## 2024-11-02 LAB — HCG INTACT+B SERPL-ACNC: 255 MIU/ML

## 2024-11-08 ENCOUNTER — LAB (OUTPATIENT)
Dept: LAB | Facility: CLINIC | Age: 44
End: 2024-11-08

## 2024-11-08 DIAGNOSIS — O20.9 BLEEDING IN EARLY PREGNANCY: ICD-10-CM

## 2024-11-08 LAB — HCG INTACT+B SERPL-ACNC: 59 MIU/ML

## 2024-11-08 PROCEDURE — 36415 COLL VENOUS BLD VENIPUNCTURE: CPT

## 2024-11-08 PROCEDURE — 84702 CHORIONIC GONADOTROPIN TEST: CPT

## 2024-11-11 NOTE — PATIENT INSTRUCTIONS
If you have labs or imaging done, the results will automatically release in [employer] without an interpretation.  Your health care professional will review those results and send an interpretation with recommendations as soon as possible, but this may be 1-3 business days.    If you have any questions regarding your visit, please contact your care team.     [employer] [facility]: [phone]  [facility] CLINIC HOURS TELEPHONE NUMBER   Sydni Almaguer, IESHA Sifuentes-MINO West-MINO Gray-Surgery Scheduler  Mayte-       Monday- [place]  8:00 am-4:00 pm    Tuesday- [place]  8:00 am-4:00 pm    Wednesday- [place] 8:00 am-4:00 pm    Thursday- [place] 8:00 am-4:00 pm    Friday- [place]  8:00 am-4:00 pm [facility]  [address] [place]  [place] [address]  PH: [phone]  Fax: [phone]    Imaging Scheduling all locations  PH: [phone]     [facility] and Delivery  [address] [place] [address] [address].  [place], [place] [address] [phone]    [facility]  [address] [place] [address]  PH: [phone]     **Surgeries** Our Surgery Schedulers will contact you to schedule. If you do not receive a call within 3 business days, please call [phone].  Urgent Care locations:  [facility]       Monday-Friday   10 am - 8 pm    Saturday and Sunday   9 am - 5 pm   [phone]   If you need a medication refill, please contact your pharmacy. Please allow 3 business days for your refill to be completed.  As always, Thank you for trusting us with your healthcare needs!

## 2024-11-12 ENCOUNTER — OFFICE VISIT (OUTPATIENT)
Dept: OBGYN | Facility: CLINIC | Age: 44
End: 2024-11-12

## 2024-11-12 DIAGNOSIS — R35.0 URINARY FREQUENCY: ICD-10-CM

## 2024-11-12 DIAGNOSIS — Z30.09 BIRTH CONTROL COUNSELING: ICD-10-CM

## 2024-11-12 DIAGNOSIS — O03.9 FOLLOW-UP VISIT AFTER MISCARRIAGE: Primary | ICD-10-CM

## 2024-11-12 DIAGNOSIS — Z51.89 FOLLOW-UP VISIT AFTER MISCARRIAGE: Primary | ICD-10-CM

## 2024-11-12 LAB
ALBUMIN UR-MCNC: NEGATIVE MG/DL
APPEARANCE UR: CLEAR
BILIRUB UR QL STRIP: NEGATIVE
COLOR UR AUTO: YELLOW
GLUCOSE UR STRIP-MCNC: NEGATIVE MG/DL
HGB UR QL STRIP: NEGATIVE
KETONES UR STRIP-MCNC: NEGATIVE MG/DL
LEUKOCYTE ESTERASE UR QL STRIP: NEGATIVE
NITRATE UR QL: NEGATIVE
PH UR STRIP: 7.5 [PH] (ref 5–7)
SP GR UR STRIP: 1.01 (ref 1–1.03)
UROBILINOGEN UR STRIP-ACNC: 0.2 E.U./DL

## 2024-11-12 PROCEDURE — G2211 COMPLEX E/M VISIT ADD ON: HCPCS

## 2024-11-12 PROCEDURE — 99203 OFFICE O/P NEW LOW 30 MIN: CPT

## 2024-11-12 PROCEDURE — 81003 URINALYSIS AUTO W/O SCOPE: CPT

## 2024-11-12 RX ORDER — ACETAMINOPHEN 160 MG
1 TABLET,DISINTEGRATING ORAL DAILY
COMMUNITY
Start: 2024-06-03

## 2024-11-12 RX ORDER — NORETHINDRONE ACETATE AND ETHINYL ESTRADIOL 1MG-20(21)
1 KIT ORAL DAILY
Qty: 84 TABLET | Refills: 3 | Status: SHIPPED | OUTPATIENT
Start: 2024-11-12

## 2024-11-12 NOTE — PROGRESS NOTES
Subjective:  Adri is a 44 year old   is here today with the following concerns:    Birth control counseling: she recently had a miscarriage and is currently trending hCG down. This was an unplanned pregnancy and she is hoping to prevent going forward. Interested in starting the pill. Regarding her miscarriage, she had 1-2 days of very light spotting with wiping and no other bleeding or pain.   Urinary frequency: wanting UA/UC today for recent increased urination that she has noticed. No pain or abnormal urine or fever.    hCG history  24: 59  24: 255  10/30/24: 404    ROS: Pertinent ROS as above.    Medical history  OB History    Para Term  AB Living   5 5 5 0 0 5   SAB IAB Ectopic Multiple Live Births   0 0 0 0 5      # Outcome Date GA Lbr Rosalio/2nd Weight Sex Type Anes PTL Lv   5 Term 20 39w0d 02:59 / 00:04 3.29 kg (7 lb 4.1 oz) F  None  GERONIMO      Name: Jenn      Apgar1: 8  Apgar5: 9   4 Term 10/24/15 38w0d 02:26 / 00:04 3.317 kg (7 lb 5 oz) M Vag-Spont  N GERONIMO      Apgar1: 9  Apgar5: 9   3 Term 06 40w0d  3.629 kg (8 lb) M Vag-Spont  N GERONIMO   2 Term 99 40w0d  3.175 kg (7 lb) F Vag-Spont  N GERONIMO   1 Term 97 40w0d  2.948 kg (6 lb 8 oz) M Vag-Spont  N GERONIMO      Past Medical History:   Diagnosis Date    Infection due to  novel coronavirus       Past Surgical History:   Procedure Laterality Date    NO HISTORY OF SURGERY         ALL/Meds: Her medication and allergy histories were reviewed and are documented in their appropriate chart areas.    SH: Reviewed and documented in the appropriate area of the chart.  FH:  Her family history is reviewed and updated in the chart, today.  PMH: Her past medical, surgical, and obstetric histories were reviewed and updated today in the appropriate chart areas.    Objective:  PE: LMP  (LMP Unknown)   Breastfeeding No   There is no height or weight on file to calculate BMI.    Pertinent Physical exam findings:    GENERAL:  Active, alert, in no acute distress.  SKIN: Clear. No significant rash, abnormal pigmentation or lesions  MS: no gross musculoskeletal defects noted, no edema  PSYCH: Age-appropriate alertness and orientation    A/P:  Adri Richard is a 44 year old  here today with the following concerns:  (Z51.89,  O03.9) Follow-up visit after miscarriage  (primary encounter diagnosis)  Plan: Repeat hCG this coming week to ensure it is now at a negative value    (R35.0) Urinary frequency  Plan: UA Macroscopic with reflex to Microscopic and         Culture - Lab Collect          (Z30.09) Birth control counseling  Comment: The use of the oral contraceptive pill has been fully discussed with the patient. This includes the proper method to initiate and continue the pill, the need for regular compliance to ensure adequate contraceptive effect, the physiology which makes the pill effective, the instructions for what to do in event of a missed pill, and warnings about anticipated minor side effects such as breakthrough spotting, nausea, breast tenderness, weight changes, acne, headaches, etc. She was informed of the irregular bleeding pattern that can occur when the pill is first started or a new form is changed over for the first 2-3 months. She has been told of the more serious potential side effects such as MI, stroke, and deep vein thrombosis, all of which are very unlikely. She has been asked to report any signs of such serious problems immediately. She understands and wishes to take the medication as prescribed. Denies history of or current migraines with aura, HTN, smoking, blood/stroke or clotting disorder, known ischemic heart disease, thrombogenic mutations, VTE, history of stroke, complicated valvular heart disease, liver disease, recent prolonged immobility, gallbladder disease, systemic lupus erythematosus or current breast/estrogen sensitive cancers.   Plan: norethindrone-ethinyl estradiol (JUNEL FE  1/20)        1-20 MG-MCG tablet          She is agreeable to the plan above and has no further questions or concerns. She did not request a chaperone for the physical exam component of the visit today.     TIMOTHY Gutiérrez CNP

## 2025-04-05 ENCOUNTER — HEALTH MAINTENANCE LETTER (OUTPATIENT)
Age: 45
End: 2025-04-05

## 2025-04-08 ENCOUNTER — TELEPHONE (OUTPATIENT)
Dept: OBGYN | Facility: CLINIC | Age: 45
End: 2025-04-08

## 2025-04-08 NOTE — TELEPHONE ENCOUNTER
Pt prescribed norethindrone-ethinyl estradiol (JUNEL FE 1/20) 1-20 MG-MCG tablet  on 11/12/2024    Pt asking if she RN called pt- pt states she was prescribed sprintec instead at a free clinic and desires to take that instead- RN advised this is OK.    Pt states she had headaches daily a month ago but they have now improved/resolved. RN advised if headaches start again or she notices vision changes, to discontinue the sprintec as she would likely need a POP.    Patient verbalized understanding and agreed to plan.     Maria Snyder RN on 4/8/2025 at 11:51 AM

## 2025-04-08 NOTE — TELEPHONE ENCOUNTER
[facility] Call Center    Phone Message    May a detailed message be left on voicemail: yes     Reason for Call: Medication Question or concern regarding medication   Prescription Clarification  Name of Medication: norethindrone-ethinyl estradiol (JUNEL FE 1/20)   Prescribing Provider: Sydni Almaguer CNP   Pharmacy: [facility]   What on the order needs clarification? Patient is wondering if the prescription can be switched to the Sprintek birth control instead. Please call the patient back to discuss. Thank you      Action Taken: Other: obgyn    Travel Screening: Not Applicable     Date of Service: